# Patient Record
Sex: FEMALE | Race: WHITE | Employment: FULL TIME | URBAN - NONMETROPOLITAN AREA
[De-identification: names, ages, dates, MRNs, and addresses within clinical notes are randomized per-mention and may not be internally consistent; named-entity substitution may affect disease eponyms.]

---

## 2017-06-26 ENCOUNTER — OFFICE VISIT (OUTPATIENT)
Dept: FAMILY MEDICINE CLINIC | Age: 52
End: 2017-06-26

## 2017-06-26 VITALS
RESPIRATION RATE: 20 BRPM | TEMPERATURE: 97.8 F | OXYGEN SATURATION: 95 % | WEIGHT: 126.8 LBS | SYSTOLIC BLOOD PRESSURE: 94 MMHG | DIASTOLIC BLOOD PRESSURE: 58 MMHG | BODY MASS INDEX: 20.38 KG/M2 | HEART RATE: 88 BPM | HEIGHT: 66 IN

## 2017-06-26 DIAGNOSIS — J36 PERITONSILLAR ABSCESS: Primary | ICD-10-CM

## 2017-06-26 PROBLEM — E86.0 DEHYDRATION: Status: ACTIVE | Noted: 2017-06-26

## 2017-06-26 PROCEDURE — 99214 OFFICE O/P EST MOD 30 MIN: CPT | Performed by: FAMILY MEDICINE

## 2017-06-26 ASSESSMENT — ENCOUNTER SYMPTOMS
WHEEZING: 0
EYE PAIN: 0
BLOOD IN STOOL: 0
CONSTIPATION: 0
DIARRHEA: 0
ABDOMINAL PAIN: 0
SORE THROAT: 1
CHEST TIGHTNESS: 0
SHORTNESS OF BREATH: 0
BACK PAIN: 0
NAUSEA: 0
VOMITING: 0
COUGH: 0
RHINORRHEA: 0

## 2017-06-28 ENCOUNTER — TELEPHONE (OUTPATIENT)
Dept: FAMILY MEDICINE CLINIC | Age: 52
End: 2017-06-28

## 2017-07-05 ENCOUNTER — OFFICE VISIT (OUTPATIENT)
Dept: FAMILY MEDICINE CLINIC | Age: 52
End: 2017-07-05

## 2017-07-05 VITALS
SYSTOLIC BLOOD PRESSURE: 100 MMHG | RESPIRATION RATE: 12 BRPM | DIASTOLIC BLOOD PRESSURE: 64 MMHG | BODY MASS INDEX: 20.27 KG/M2 | HEART RATE: 64 BPM | WEIGHT: 125.6 LBS

## 2017-07-05 DIAGNOSIS — J36 PERITONSILLAR ABSCESS: Primary | ICD-10-CM

## 2017-07-05 DIAGNOSIS — F17.210 CIGARETTE NICOTINE DEPENDENCE WITHOUT COMPLICATION: ICD-10-CM

## 2017-07-05 DIAGNOSIS — J02.9 SORE THROAT: ICD-10-CM

## 2017-07-05 PROCEDURE — 99495 TRANSJ CARE MGMT MOD F2F 14D: CPT | Performed by: FAMILY MEDICINE

## 2017-07-05 ASSESSMENT — ENCOUNTER SYMPTOMS
RHINORRHEA: 0
DIARRHEA: 0
EYE PAIN: 0
BACK PAIN: 0
COUGH: 0
SORE THROAT: 1
BLOOD IN STOOL: 0
WHEEZING: 0
VOMITING: 0
ABDOMINAL PAIN: 0
SHORTNESS OF BREATH: 0
CHEST TIGHTNESS: 0
NAUSEA: 0
CONSTIPATION: 0

## 2017-07-05 ASSESSMENT — PATIENT HEALTH QUESTIONNAIRE - PHQ9
2. FEELING DOWN, DEPRESSED OR HOPELESS: 1
1. LITTLE INTEREST OR PLEASURE IN DOING THINGS: 0
SUM OF ALL RESPONSES TO PHQ9 QUESTIONS 1 & 2: 1
SUM OF ALL RESPONSES TO PHQ QUESTIONS 1-9: 1

## 2017-10-30 ENCOUNTER — OFFICE VISIT (OUTPATIENT)
Dept: FAMILY MEDICINE CLINIC | Age: 52
End: 2017-10-30
Payer: COMMERCIAL

## 2017-10-30 VITALS
BODY MASS INDEX: 20.01 KG/M2 | DIASTOLIC BLOOD PRESSURE: 60 MMHG | HEART RATE: 84 BPM | SYSTOLIC BLOOD PRESSURE: 104 MMHG | WEIGHT: 124 LBS | RESPIRATION RATE: 16 BRPM

## 2017-10-30 DIAGNOSIS — F17.210 CIGARETTE NICOTINE DEPENDENCE WITHOUT COMPLICATION: ICD-10-CM

## 2017-10-30 DIAGNOSIS — N91.2 AMENORRHEA: ICD-10-CM

## 2017-10-30 DIAGNOSIS — F32.A DEPRESSION, UNSPECIFIED DEPRESSION TYPE: ICD-10-CM

## 2017-10-30 DIAGNOSIS — F41.9 ANXIETY: Primary | ICD-10-CM

## 2017-10-30 PROCEDURE — 99214 OFFICE O/P EST MOD 30 MIN: CPT | Performed by: FAMILY MEDICINE

## 2017-10-30 ASSESSMENT — ENCOUNTER SYMPTOMS
ABDOMINAL PAIN: 0
NAUSEA: 0
COUGH: 0
VOMITING: 0
BACK PAIN: 0
RHINORRHEA: 0
BLOOD IN STOOL: 0
EYE PAIN: 0
CHEST TIGHTNESS: 0
SORE THROAT: 0
WHEEZING: 0
CONSTIPATION: 0
SHORTNESS OF BREATH: 0
DIARRHEA: 0

## 2017-10-30 NOTE — PROGRESS NOTES
Subjective:      Patient ID: Francis Finch is a 47 y.o. female. HPI  Pt here for a check up. Reviewed BMI of 20. Feeling nervous and on edge. Cries for no reason. Sxs x 1 month. No period since June 2017. , current smoker, pmh reviewed. Lost her dad recently. Review of Systems   Constitutional: Negative for chills, fatigue, fever and unexpected weight change. HENT: Negative for congestion, ear pain, rhinorrhea and sore throat. Eyes: Negative for pain and visual disturbance. Respiratory: Negative for cough, chest tightness, shortness of breath and wheezing. Cardiovascular: Negative for chest pain and palpitations. Gastrointestinal: Negative for abdominal pain, blood in stool, constipation, diarrhea, nausea and vomiting. Genitourinary: Positive for menstrual problem. Negative for difficulty urinating, frequency, hematuria and urgency. Musculoskeletal: Negative for back pain, joint swelling, myalgias and neck pain. Skin: Negative for rash. Neurological: Negative for dizziness and headaches. Hematological: Negative for adenopathy. Does not bruise/bleed easily. Psychiatric/Behavioral: Positive for dysphoric mood. Negative for behavioral problems and sleep disturbance. The patient is nervous/anxious. Objective:   Physical Exam   Constitutional: She is oriented to person, place, and time. She appears well-developed and well-nourished. HENT:   Head: Normocephalic and atraumatic. Right Ear: External ear normal.   Left Ear: External ear normal.   Nose: Nose normal.   Mouth/Throat: Oropharynx is clear and moist.   Eyes: EOM are normal. Pupils are equal, round, and reactive to light. Neck: Neck supple. Carotid bruit is not present. No thyromegaly present. Cardiovascular: Normal rate, regular rhythm and normal heart sounds. Pulmonary/Chest: Breath sounds normal. She has no wheezes. She has no rales. Abdominal: Soft.  Bowel sounds are normal. There is no

## 2017-10-30 NOTE — PATIENT INSTRUCTIONS
Patient Education        Depression Treatment: Care Instructions  Your Care Instructions  Depression is a condition that affects the way you feel, think, and act. It causes symptoms such as low energy, loss of interest in daily activities, and sadness or grouchiness that goes on for a long time. Depression is very common and affects men and women of all ages. Depression is a medical illness caused by changes in the natural chemicals in your brain. It is not a character flaw, and it does not mean that you are a bad or weak person. It does not mean that you are going crazy. It is important to know that depression can be treated. Medicines, counseling, and self-care can all help. Many people do not get help because they are embarrassed or think that they will get over the depression on their own. But some people do not get better without treatment. Follow-up care is a key part of your treatment and safety. Be sure to make and go to all appointments, and call your doctor if you are having problems. It's also a good idea to know your test results and keep a list of the medicines you take. How can you care for yourself at home? Learn about antidepressant medicines  Antidepressant medicines can improve or end the symptoms of depression. You may need to take the medicine for at least 6 months, and often longer. Keep taking your medicine even if you feel better. If you stop taking it too soon, your symptoms may come back or get worse. You may start to feel better within 1 to 3 weeks of taking antidepressant medicine. But it can take as many as 6 to 8 weeks to see more improvement. Talk to your doctor if you have problems with your medicine or if you do not notice any improvement after 3 weeks. Antidepressants can make you feel tired, dizzy, or nervous. Some people have dry mouth, constipation, headaches, sexual problems, an upset stomach, or diarrhea.  Many of these side effects are mild and go away on their own after you take the medicine for a few weeks. Some may last longer. Talk to your doctor if side effects bother you too much. You might be able to try a different medicine. If you are pregnant or breastfeeding, talk to your doctor about what medicines you can take. Learn about counseling  In many cases, counseling can work as well as medicines to treat mild to moderate depression. Counseling is done by licensed mental health providers, such as psychologists, social workers, and some types of nurses. It can be done in one-on-one sessions or in a group setting. Many people find group sessions helpful. Cognitive-behavioral therapy is a type of counseling. In this treatment therapy, you learn how to see and change unhelpful thinking styles that may be adding to your depression. Counseling and medicines often work well when used together. To manage depression  · Be physically active. Getting 30 minutes of exercise each day is good for your body and your mind. Begin slowly if it is hard for you to get started. If you already exercise, keep it up. · Plan something pleasant for yourself every day. Include activities that you have enjoyed in the past.  · Get enough sleep. Talk to your doctor if you have problems sleeping. · Eat a balanced diet. If you do not feel hungry, eat small snacks rather than large meals. · Do not drink alcohol, use illegal drugs, or take medicines that your doctor has not prescribed for you. They may interfere with your treatment. · Spend time with family and friends. It may help to speak openly about your depression with people you trust.  · Take your medicines exactly as prescribed. Call your doctor if you think you are having a problem with your medicine. · Do not make major life decisions while you are depressed. Depression may change the way you think. You will be able to make better decisions after you feel better. · Think positively.  Challenge negative thoughts with statements such as \"I am hopeful\"; \"Things will get better\"; and \"I can ask for the help I need. \" Write down these statements and read them often, even if you don't believe them yet. · Be patient with yourself. It took time for your depression to develop, and it will take time for your symptoms to improve. Do not take on too much or be too hard on yourself. · Learn all you can about depression from written and online materials. · Check out behavioral health classes to learn more about dealing with depression. · Keep the numbers for these national suicide hotlines: 0-237-678-TALK (9-387.527.8537) and 3-390-MUYUMMB (5-826.948.2525). If you or someone you know talks about suicide or feeling hopeless, get help right away. When should you call for help? Call 911 anytime you think you may need emergency care. For example, call if:  · You feel you cannot stop from hurting yourself or someone else. Call your doctor now or seek immediate medical care if:  · You hear voices. · You feel much more depressed. Watch closely for changes in your health, and be sure to contact your doctor if:  · You are having problems with your depression medicine. · You are not getting better as expected. Where can you learn more? Go to https://chilaeb.[employer]. org and sign in to your [employer] account. Enter P339 in the BallLogic box to learn more about \"Depression Treatment: Care Instructions. \"     If you do not have an account, please click on the \"Sign Up Now\" link. Current as of: July 26, 2016  Content Version: 11.3  © 0525-3991 [employer]Today.[employer]. Care instructions adapted under license by [facility] ([facility]). If you have questions about a medical condition or this instruction, always ask your healthcare professional. [address] any warranty or liability for your use of this information.        Patient Education        Anxiety Disorder: Care Instructions  Your Care Instructions  Anxiety is a normal are.  · Get at least 30 minutes of exercise on most days of the week to relieve stress. Walking is a good choice. You also may want to do other activities, such as running, swimming, cycling, or playing tennis or team sports. Relaxation techniques  Do relaxation exercises 10 to 20 minutes a day. You can play soothing, relaxing music while you do them, if you wish. · Tell others in your house that you are going to do your relaxation exercises. Ask them not to disturb you. · Find a comfortable place, away from all distractions and noise. · Lie down on your back, or sit with your back straight. · Focus on your breathing. Make it slow and steady. · Breathe in through your nose. Breathe out through either your nose or mouth. · Breathe deeply, filling up the area between your navel and your rib cage. Breathe so that your belly goes up and down. · Do not hold your breath. · Breathe like this for 5 to 10 minutes. Notice the feeling of calmness throughout your whole body. As you continue to breathe slowly and deeply, relax by doing the following for another 5 to 10 minutes:  · Tighten and relax each muscle group in your body. You can begin at your toes and work your way up to your head. · Imagine your muscle groups relaxing and becoming heavy. · Empty your mind of all thoughts. · Let yourself relax more and more deeply. · Become aware of the state of calmness that surrounds you. · When your relaxation time is over, you can bring yourself back to alertness by moving your fingers and toes and then your hands and feet and then stretching and moving your entire body. Sometimes people fall asleep during relaxation, but they usually wake up shortly afterward. · Always give yourself time to return to full alertness before you drive a car or do anything that might cause an accident if you are not fully alert. Never play a relaxation tape while you drive a car. When should you call for help?   Call 911 anytime you program to help you quit smoking. When you choose a program, look for one that has proven success. Ask your doctor for ideas. You will greatly increase your chances of success if you take medicine as well as get counseling or join a cessation program.  Some of the changes you feel when you first quit tobacco are uncomfortable. Your body will miss the nicotine at first, and you may feel short-tempered and grumpy. You may have trouble sleeping or concentrating. Medicine can help you deal with these symptoms. You may struggle with changing your smoking habits and rituals. The last step is the tricky one: Be prepared for the smoking urge to continue for a time. This is a lot to deal with, but keep at it. You will feel better. Follow-up care is a key part of your treatment and safety. Be sure to make and go to all appointments, and call your doctor if you are having problems. Its also a good idea to know your test results and keep a list of the medicines you take. How can you care for yourself at home? · Ask your family, friends, and coworkers for support. You have a better chance of quitting if you have help and support. · Join a support group, such as Nicotine Anonymous, for people who are trying to quit smoking. · Consider signing up for a smoking cessation program, such as the American Lung Association's Freedom from Smoking program.  · Set a quit date. Pick your date carefully so that it is not right in the middle of a big deadline or stressful time. Once you quit, do not even take a puff. Get rid of all ashtrays and lighters after your last cigarette. Clean your house and your clothes so that they do not smell of smoke. · Learn how to be a nonsmoker. Think about ways you can avoid those things that make you reach for a cigarette. ¨ Avoid situations that put you at greatest risk for smoking. For some people, it is hard to have a drink with friends without smoking.  For others, they might skip a coffee break with coworkers who smoke. ¨ Change your daily routine. Take a different route to work or eat a meal in a different place. · Cut down on stress. Calm yourself or release tension by doing an activity you enjoy, such as reading a book, taking a hot bath, or gardening. · Talk to your doctor or pharmacist about nicotine replacement therapy, which replaces the nicotine in your body. You still get nicotine but you do not use tobacco. Nicotine replacement products help you slowly reduce the amount of nicotine you need. These products come in several forms, many of them available over-the-counter:  ¨ Nicotine patches  ¨ Nicotine gum and lozenges  ¨ Nicotine inhaler  · Ask your doctor about bupropion (Wellbutrin) or varenicline (Chantix), which are prescription medicines. They do not contain nicotine. They help you by reducing withdrawal symptoms, such as stress and anxiety. · Some people find hypnosis, acupuncture, and massage helpful for ending the smoking habit. · Eat a healthy diet and get regular exercise. Having healthy habits will help your body move past its craving for nicotine. · Be prepared to keep trying. Most people are not successful the first few times they try to quit. Do not get mad at yourself if you smoke again. Make a list of things you learned and think about when you want to try again, such as next week, next month, or next year. Where can you learn more? Go to https://Lookingglass Cyber [email or web address].Dynadmic. org and sign in to your [employer] account. Enter G932 in the LDL Technology box to learn more about \"Stopping Smoking: Care Instructions. \"     If you do not have an account, please click on the \"Sign Up Now\" link. Current as of: March 20, 2017  Content Version: 11.3  © 3472-7789 [employer]gly. Care instructions adapted under license by [facility] ([facility]).  If you have questions about a medical condition or this instruction, always ask your healthcare professional. Dina Diaz

## 2017-10-31 LAB
ABSOLUTE BASO #: 0.1 K/UL (ref 0–0.1)
ABSOLUTE EOS #: 0.1 K/UL (ref 0.1–0.4)
ABSOLUTE LYMPH #: 2.4 K/UL (ref 0.8–5.2)
ABSOLUTE MONO #: 0.6 K/UL (ref 0.1–0.9)
ABSOLUTE NEUT #: 4.6 K/UL (ref 1.3–9.1)
ALBUMIN SERPL-MCNC: 4.2 G/DL (ref 3.2–5.3)
ALK PHOSPHATASE: 40 IU/L (ref 35–121)
ALT SERPL-CCNC: 13 IU/L (ref 5–59)
ANION GAP SERPL CALCULATED.3IONS-SCNC: 11 MMOL/L
AST SERPL-CCNC: 16 IU/L (ref 10–42)
BASOPHILS RELATIVE PERCENT: 1 %
BILIRUB SERPL-MCNC: 0.5 MG/DL (ref 0.2–1.3)
BUN BLDV-MCNC: 15 MG/DL (ref 10–20)
CALCIUM SERPL-MCNC: 9.4 MG/DL (ref 8.7–10.8)
CHLORIDE BLD-SCNC: 108 MMOL/L (ref 95–111)
CO2: 27 MMOL/L (ref 21–32)
CREAT SERPL-MCNC: 0.8 MG/DL (ref 0.5–1.3)
EGFR AFRICAN AMERICAN: 91
EGFR IF NONAFRICAN AMERICAN: 75
EOSINOPHILS RELATIVE PERCENT: 1 %
FOLLICLE STIMULATING HORMONE: 67.1 MIU/ML
GLUCOSE: 79 MG/DL (ref 70–100)
HCT VFR BLD CALC: 43.1 % (ref 36–48)
HEMOGLOBIN: 14.5 G/DL (ref 12–16)
LYMPHOCYTE %: 31.2 %
MCH RBC QN AUTO: 29.4 PG (ref 27–34)
MCHC RBC AUTO-ENTMCNC: 33.6 G/DL (ref 31–36)
MCV RBC AUTO: 87.2 FL (ref 80–100)
MONOCYTES # BLD: 7.1 %
NEUTROPHILS RELATIVE PERCENT: 59.4 %
PDW BLD-RTO: 12.9 % (ref 10.8–14.8)
PLATELETS: 231 K/UL (ref 150–450)
POTASSIUM SERPL-SCNC: 4.4 MMOL/L (ref 3.5–5.4)
RBC: 4.94 M/UL (ref 4–5.5)
SODIUM BLD-SCNC: 142 MMOL/L (ref 134–147)
TOTAL PROTEIN: 6.5 G/DL (ref 5.8–8)
WBC: 7.7 K/UL (ref 3.7–10.8)

## 2017-11-01 LAB — ESTROGEN TOTAL: 181 PG/ML

## 2017-11-02 ENCOUNTER — TELEPHONE (OUTPATIENT)
Dept: FAMILY MEDICINE CLINIC | Age: 52
End: 2017-11-02

## 2017-11-02 NOTE — TELEPHONE ENCOUNTER
----- Message from Amaury Canchola MD sent at 11/2/2017  8:41 AM EDT -----  Estrogen level is good. FSH is high c/w menopausal.  CBC and cmp are normal.  Labs are good except looks like menopause.   Does she want to start anxiety/depression med??

## 2017-11-22 NOTE — TELEPHONE ENCOUNTER
I have attempted without success to contact this patient by phone to notify them of lab results. Letter mailed to patient requesting patient to call the office for results.

## 2018-01-31 ENCOUNTER — OFFICE VISIT (OUTPATIENT)
Dept: FAMILY MEDICINE CLINIC | Age: 53
End: 2018-01-31
Payer: COMMERCIAL

## 2018-01-31 VITALS
HEART RATE: 94 BPM | HEIGHT: 67 IN | DIASTOLIC BLOOD PRESSURE: 60 MMHG | SYSTOLIC BLOOD PRESSURE: 98 MMHG | WEIGHT: 122.2 LBS | RESPIRATION RATE: 12 BRPM | BODY MASS INDEX: 19.18 KG/M2

## 2018-01-31 DIAGNOSIS — F17.210 CIGARETTE NICOTINE DEPENDENCE WITHOUT COMPLICATION: ICD-10-CM

## 2018-01-31 DIAGNOSIS — R63.0 DECREASED APPETITE: ICD-10-CM

## 2018-01-31 DIAGNOSIS — F41.9 ANXIETY: Primary | ICD-10-CM

## 2018-01-31 PROCEDURE — 99214 OFFICE O/P EST MOD 30 MIN: CPT | Performed by: FAMILY MEDICINE

## 2018-01-31 RX ORDER — PAROXETINE HYDROCHLORIDE 20 MG/1
20 TABLET, FILM COATED ORAL EVERY MORNING
Qty: 30 TABLET | Refills: 0 | Status: SHIPPED | OUTPATIENT
Start: 2018-01-31 | End: 2018-02-27 | Stop reason: SDUPTHER

## 2018-01-31 RX ORDER — HYDROXYZINE HYDROCHLORIDE 25 MG/1
25 TABLET, FILM COATED ORAL 3 TIMES DAILY PRN
Qty: 30 TABLET | Refills: 0 | Status: SHIPPED | OUTPATIENT
Start: 2018-01-31 | End: 2018-09-28 | Stop reason: SDUPTHER

## 2018-01-31 ASSESSMENT — ENCOUNTER SYMPTOMS
DIARRHEA: 0
VOMITING: 0
CONSTIPATION: 0
EYE PAIN: 0
CHEST TIGHTNESS: 0
SHORTNESS OF BREATH: 0
RHINORRHEA: 0
WHEEZING: 0
COUGH: 0
BACK PAIN: 0
NAUSEA: 0
SORE THROAT: 0
ABDOMINAL PAIN: 0
BLOOD IN STOOL: 0

## 2018-01-31 NOTE — PATIENT INSTRUCTIONS
Patient Education        Stopping Smoking: Care Instructions  Your Care Instructions    Cigarette smokers crave the nicotine in cigarettes. Giving it up is much harder than simply changing a habit. Your body has to stop craving the nicotine. It is hard to quit, but you can do it. There are many tools that people use to quit smoking. You may find that combining tools works best for you. There are several steps to quitting. First you get ready to quit. Then you get support to help you. After that, you learn new skills and behaviors to become a nonsmoker. For many people, a necessary step is getting and using medicine. Your doctor will help you set up the plan that best meets your needs. You may want to attend a smoking cessation program to help you quit smoking. When you choose a program, look for one that has proven success. Ask your doctor for ideas. You will greatly increase your chances of success if you take medicine as well as get counseling or join a cessation program.  Some of the changes you feel when you first quit tobacco are uncomfortable. Your body will miss the nicotine at first, and you may feel short-tempered and grumpy. You may have trouble sleeping or concentrating. Medicine can help you deal with these symptoms. You may struggle with changing your smoking habits and rituals. The last step is the tricky one: Be prepared for the smoking urge to continue for a time. This is a lot to deal with, but keep at it. You will feel better. Follow-up care is a key part of your treatment and safety. Be sure to make and go to all appointments, and call your doctor if you are having problems. It's also a good idea to know your test results and keep a list of the medicines you take. How can you care for yourself at home? · Ask your family, friends, and coworkers for support. You have a better chance of quitting if you have help and support.   · Join a support group, such as Nicotine Anonymous, for people who get mad at yourself if you smoke again. Make a list of things you learned and think about when you want to try again, such as next week, next month, or next year. Where can you learn more? Go to https://ester.Playviews. org and sign in to your [employer] account. Enter C115 in the [facility] box to learn more about \"Stopping Smoking: Care Instructions. \"     If you do not have an account, please click on the \"Sign Up Now\" link. Current as of: March 20, 2017  Content Version: 11.5  © 8962-5567 [employer]. Care instructions adapted under license by [facility] ([facility]). If you have questions about a medical condition or this instruction, always ask your healthcare professional. [place] [address] any warranty or liability for your use of this information. Patient Education        Anxiety Disorder: Care Instructions  Your Care Instructions    Anxiety is a normal reaction to stress. Difficult situations can cause you to have symptoms such as sweaty palms and a nervous feeling. In an anxiety disorder, the symptoms are far more severe. Constant worry, muscle tension, trouble sleeping, nausea and diarrhea, and other symptoms can make normal daily activities difficult or impossible. These symptoms may occur for no reason, and they can affect your work, school, or social life. Medicines, counseling, and self-care can all help. Follow-up care is a key part of your treatment and safety. Be sure to make and go to all appointments, and call your doctor if you are having problems. It's also a good idea to know your test results and keep a list of the medicines you take. How can you care for yourself at home? · Take medicines exactly as directed. Call your doctor if you think you are having a problem with your medicine. · Go to your counseling sessions and follow-up appointments. · Recognize and accept your anxiety.  Then, when you are in a situation that makes you anxious, relax by doing the following for another 5 to 10 minutes:  · Tighten and relax each muscle group in your body. You can begin at your toes and work your way up to your head. · Imagine your muscle groups relaxing and becoming heavy. · Empty your mind of all thoughts. · Let yourself relax more and more deeply. · Become aware of the state of calmness that surrounds you. · When your relaxation time is over, you can bring yourself back to alertness by moving your fingers and toes and then your hands and feet and then stretching and moving your entire body. Sometimes people fall asleep during relaxation, but they usually wake up shortly afterward. · Always give yourself time to return to full alertness before you drive a car or do anything that might cause an accident if you are not fully alert. Never play a relaxation tape while you drive a car. When should you call for help? Call 911 anytime you think you may need emergency care. For example, call if:  ? · You feel you cannot stop from hurting yourself or someone else. ? Keep the numbers for these national suicide hotlines: 3-365-717-TALK (5-314-481-423-601-8186) and 3-980-DEKFURV (9-696.924.2365). If you or someone you know talks about suicide or feeling hopeless, get help right away. ? Watch closely for changes in your health, and be sure to contact your doctor if:  ? · You have anxiety or fear that affects your life. ? · You have symptoms of anxiety that are new or different from those you had before. Where can you learn more? Go to https://BioMimetic Therapeuticstobias.[employer]. org and sign in to your [employer] account. Enter P754 in the Threadflip box to learn more about \"Anxiety Disorder: Care Instructions. \"     If you do not have an account, please click on the \"Sign Up Now\" link. Current as of: May 12, 2017  Content Version: 11.5  © 0251-9478 Healthwise, Inc[employer]. Care instructions adapted under license by [facility] ([facility]).  If you have questions about a

## 2018-02-27 DIAGNOSIS — F41.9 ANXIETY: ICD-10-CM

## 2018-02-27 RX ORDER — PAROXETINE HYDROCHLORIDE 20 MG/1
20 TABLET, FILM COATED ORAL EVERY MORNING
Qty: 30 TABLET | Refills: 7 | Status: SHIPPED | OUTPATIENT
Start: 2018-02-27 | End: 2018-09-28 | Stop reason: SDUPTHER

## 2018-09-26 PROBLEM — E86.0 DEHYDRATION: Status: RESOLVED | Noted: 2017-06-26 | Resolved: 2018-09-26

## 2018-09-28 ENCOUNTER — OFFICE VISIT (OUTPATIENT)
Dept: FAMILY MEDICINE CLINIC | Age: 53
End: 2018-09-28
Payer: COMMERCIAL

## 2018-09-28 VITALS
BODY MASS INDEX: 20.19 KG/M2 | RESPIRATION RATE: 12 BRPM | SYSTOLIC BLOOD PRESSURE: 92 MMHG | HEART RATE: 66 BPM | WEIGHT: 127 LBS | DIASTOLIC BLOOD PRESSURE: 60 MMHG

## 2018-09-28 DIAGNOSIS — F41.9 ANXIETY: Primary | ICD-10-CM

## 2018-09-28 PROCEDURE — 99213 OFFICE O/P EST LOW 20 MIN: CPT | Performed by: FAMILY MEDICINE

## 2018-09-28 RX ORDER — HYDROXYZINE HYDROCHLORIDE 25 MG/1
25 TABLET, FILM COATED ORAL 3 TIMES DAILY PRN
Qty: 30 TABLET | Refills: 0 | Status: SHIPPED | OUTPATIENT
Start: 2018-09-28 | End: 2018-12-18

## 2018-09-28 RX ORDER — PAROXETINE HYDROCHLORIDE 20 MG/1
20 TABLET, FILM COATED ORAL EVERY MORNING
Qty: 30 TABLET | Refills: 11 | Status: SHIPPED | OUTPATIENT
Start: 2018-09-28 | End: 2019-07-18 | Stop reason: SDUPTHER

## 2018-09-28 ASSESSMENT — ENCOUNTER SYMPTOMS
SHORTNESS OF BREATH: 0
CHEST TIGHTNESS: 0
EYE PAIN: 0
VOMITING: 0
RHINORRHEA: 0
COUGH: 0
SORE THROAT: 0
WHEEZING: 0
NAUSEA: 0
ABDOMINAL PAIN: 0
BACK PAIN: 0
BLOOD IN STOOL: 0
CONSTIPATION: 0
DIARRHEA: 0

## 2018-12-18 ENCOUNTER — OFFICE VISIT (OUTPATIENT)
Dept: FAMILY MEDICINE CLINIC | Age: 53
End: 2018-12-18
Payer: COMMERCIAL

## 2018-12-18 VITALS
HEART RATE: 72 BPM | BODY MASS INDEX: 21.28 KG/M2 | RESPIRATION RATE: 12 BRPM | SYSTOLIC BLOOD PRESSURE: 112 MMHG | WEIGHT: 132.4 LBS | TEMPERATURE: 98.1 F | HEIGHT: 66 IN | DIASTOLIC BLOOD PRESSURE: 68 MMHG

## 2018-12-18 DIAGNOSIS — F17.210 CIGARETTE NICOTINE DEPENDENCE WITHOUT COMPLICATION: ICD-10-CM

## 2018-12-18 DIAGNOSIS — R20.2 ARM PARESTHESIA, LEFT: ICD-10-CM

## 2018-12-18 DIAGNOSIS — M50.30 DDD (DEGENERATIVE DISC DISEASE), CERVICAL: Primary | ICD-10-CM

## 2018-12-18 PROCEDURE — 99214 OFFICE O/P EST MOD 30 MIN: CPT | Performed by: FAMILY MEDICINE

## 2018-12-18 RX ORDER — PREDNISONE 20 MG/1
TABLET ORAL
Qty: 15 TABLET | Refills: 0 | Status: SHIPPED | OUTPATIENT
Start: 2018-12-18 | End: 2019-01-17

## 2018-12-18 RX ORDER — CYCLOBENZAPRINE HCL 10 MG
10 TABLET ORAL 3 TIMES DAILY PRN
Qty: 30 TABLET | Refills: 0 | Status: SHIPPED | OUTPATIENT
Start: 2018-12-18 | End: 2019-01-04 | Stop reason: SDUPTHER

## 2018-12-18 RX ORDER — HYDROCODONE BITARTRATE AND ACETAMINOPHEN 5; 325 MG/1; MG/1
1 TABLET ORAL EVERY 6 HOURS PRN
Qty: 28 TABLET | Refills: 0 | Status: SHIPPED | OUTPATIENT
Start: 2018-12-18 | End: 2018-12-28 | Stop reason: SDUPTHER

## 2018-12-18 ASSESSMENT — ENCOUNTER SYMPTOMS
RHINORRHEA: 0
BACK PAIN: 1
COUGH: 0
NAUSEA: 0
VOMITING: 0
DIARRHEA: 0
EYE PAIN: 0
BLOOD IN STOOL: 0
WHEEZING: 0
SHORTNESS OF BREATH: 0
SORE THROAT: 0
ABDOMINAL PAIN: 0
CHEST TIGHTNESS: 0
CONSTIPATION: 0

## 2018-12-18 ASSESSMENT — PATIENT HEALTH QUESTIONNAIRE - PHQ9
1. LITTLE INTEREST OR PLEASURE IN DOING THINGS: 0
SUM OF ALL RESPONSES TO PHQ QUESTIONS 1-9: 0
SUM OF ALL RESPONSES TO PHQ9 QUESTIONS 1 & 2: 0
SUM OF ALL RESPONSES TO PHQ QUESTIONS 1-9: 0
2. FEELING DOWN, DEPRESSED OR HOPELESS: 0

## 2018-12-18 NOTE — PATIENT INSTRUCTIONS
Patient Education        Cervical Disc Disease: Care Instructions  Your Care Instructions    Cervical disc disease results from damage, disease, or wear and tear to the discs between the bones (vertebra) in your neck. The discs act as shock absorbers for the spine and keep the spine flexible. When a disc is damaged, it can bulge out and press against the nerve roots or spinal cord. This is sometimes called a herniated or \"slipped disc. \" This pressure can cause pain and numbness or tingling in your arms and hands. It can also cause weakness in your legs. An accident can damage a disc and cause it to break open (rupture). Aging and hard physical work can also cause damage to cervical discs. The first treatments for cervical disc disease include physical therapy, special neck exercises, heat, and pain medicine. If these fail, your doctor may inject steroids and pain medicine into your neck. Surgery is usually done only if other treatments have not worked. Follow-up care is a key part of your treatment and safety. Be sure to make and go to all appointments, and call your doctor if you are having problems. It's also a good idea to know your test results and keep a list of the medicines you take. How can you care for yourself at home? · Take pain medicines exactly as directed. ? If the doctor gave you a prescription medicine for pain, take it as prescribed. ? If you are not taking a prescription pain medicine, ask your doctor if you can take an over-the-counter medicine. · Don't spend too long in one position. Take short breaks to move around and change positions. · Wear a seat belt and shoulder harness when you are in a car. · Sleep with a pillow under your head and neck that keeps your neck straight. · Follow your doctor's instructions for gentle neck-stretching exercises. · Do not smoke. Smoking can slow healing of your discs.  If you need help quitting, talk to your doctor about stop-smoking programs and

## 2018-12-18 NOTE — PROGRESS NOTES
Breath sounds normal. She has no wheezes. She has no rales. Abdominal: Soft. Bowel sounds are normal. There is no tenderness. There is no rebound and no guarding. Musculoskeletal: She exhibits no edema. Cervical back: She exhibits pain. Back:    Lymphadenopathy:     She has no cervical adenopathy. Neurological: She is alert and oriented to person, place, and time. She has normal reflexes. No cranial nerve deficit. Skin: Skin is warm and dry. No rash noted. Psychiatric: She has a normal mood and affect. Nursing note and vitals reviewed. Assessment:      Cervical DDD  Left arm paresthesias  Nicotine dependence      Plan:      Prednisone 20 mg BID/QD  Flexeril for spasms  Norco 5/325 as needed  Resee Dosier  MRI cervical spine with and without    Controlled Substances Monitoring:     RX Monitoring 12/18/2018   Attestation The Prescription Monitoring Report for this patient was reviewed today. Documentation Possible medication side effects, risk of tolerance/dependence & alternative treatments discussed. ;No signs of potential drug abuse or diversion identified.                Elton Mclaughlin MD

## 2018-12-28 DIAGNOSIS — R20.2 ARM PARESTHESIA, LEFT: ICD-10-CM

## 2018-12-28 DIAGNOSIS — M50.30 DDD (DEGENERATIVE DISC DISEASE), CERVICAL: ICD-10-CM

## 2018-12-28 RX ORDER — HYDROCODONE BITARTRATE AND ACETAMINOPHEN 5; 325 MG/1; MG/1
1 TABLET ORAL EVERY 6 HOURS PRN
Qty: 28 TABLET | Refills: 0 | Status: SHIPPED | OUTPATIENT
Start: 2018-12-28 | End: 2019-01-04 | Stop reason: SDUPTHER

## 2019-01-04 DIAGNOSIS — R20.2 ARM PARESTHESIA, LEFT: ICD-10-CM

## 2019-01-04 DIAGNOSIS — M50.30 DDD (DEGENERATIVE DISC DISEASE), CERVICAL: ICD-10-CM

## 2019-01-04 RX ORDER — HYDROCODONE BITARTRATE AND ACETAMINOPHEN 5; 325 MG/1; MG/1
1 TABLET ORAL EVERY 6 HOURS PRN
Qty: 28 TABLET | Refills: 0 | Status: SHIPPED | OUTPATIENT
Start: 2019-01-04 | End: 2019-02-22 | Stop reason: SDUPTHER

## 2019-01-04 RX ORDER — CYCLOBENZAPRINE HCL 10 MG
10 TABLET ORAL 3 TIMES DAILY PRN
Qty: 30 TABLET | Refills: 0 | Status: SHIPPED | OUTPATIENT
Start: 2019-01-04 | End: 2019-01-14

## 2019-01-10 ENCOUNTER — HOSPITAL ENCOUNTER (OUTPATIENT)
Dept: MRI IMAGING | Age: 54
Discharge: HOME | End: 2019-01-10
Payer: COMMERCIAL

## 2019-01-10 DIAGNOSIS — R20.2 ARM PARESTHESIA, LEFT: ICD-10-CM

## 2019-01-10 DIAGNOSIS — M50.30 DDD (DEGENERATIVE DISC DISEASE), CERVICAL: ICD-10-CM

## 2019-01-10 PROCEDURE — 72156 MRI NECK SPINE W/O & W/DYE: CPT

## 2019-01-10 PROCEDURE — 6360000004 HC RX CONTRAST MEDICATION: Performed by: FAMILY MEDICINE

## 2019-01-10 PROCEDURE — A9579 GAD-BASE MR CONTRAST NOS,1ML: HCPCS | Performed by: FAMILY MEDICINE

## 2019-01-10 RX ADMIN — GADOTERIDOL 10 ML: 279.3 INJECTION, SOLUTION INTRAVENOUS at 08:42

## 2019-01-11 ENCOUNTER — TELEPHONE (OUTPATIENT)
Dept: FAMILY MEDICINE CLINIC | Age: 54
End: 2019-01-11

## 2019-01-16 ENCOUNTER — TELEPHONE (OUTPATIENT)
Dept: FAMILY MEDICINE CLINIC | Age: 54
End: 2019-01-16

## 2019-01-17 ENCOUNTER — OFFICE VISIT (OUTPATIENT)
Dept: FAMILY MEDICINE CLINIC | Age: 54
End: 2019-01-17
Payer: COMMERCIAL

## 2019-01-17 VITALS
HEART RATE: 76 BPM | RESPIRATION RATE: 12 BRPM | SYSTOLIC BLOOD PRESSURE: 98 MMHG | BODY MASS INDEX: 21.82 KG/M2 | WEIGHT: 137.2 LBS | DIASTOLIC BLOOD PRESSURE: 66 MMHG

## 2019-01-17 DIAGNOSIS — F32.A DEPRESSION, UNSPECIFIED DEPRESSION TYPE: ICD-10-CM

## 2019-01-17 DIAGNOSIS — M50.30 DDD (DEGENERATIVE DISC DISEASE), CERVICAL: Primary | ICD-10-CM

## 2019-01-17 DIAGNOSIS — G93.89 BRAIN MASS: ICD-10-CM

## 2019-01-17 PROCEDURE — 99214 OFFICE O/P EST MOD 30 MIN: CPT | Performed by: FAMILY MEDICINE

## 2019-01-17 RX ORDER — CYCLOBENZAPRINE HCL 10 MG
10 TABLET ORAL
COMMUNITY
Start: 2016-03-18 | End: 2019-07-18 | Stop reason: ALTCHOICE

## 2019-01-17 ASSESSMENT — ENCOUNTER SYMPTOMS
SORE THROAT: 0
VOMITING: 0
WHEEZING: 0
SHORTNESS OF BREATH: 0
CHEST TIGHTNESS: 0
RHINORRHEA: 0
EYE PAIN: 0
COUGH: 0
ABDOMINAL PAIN: 0
BACK PAIN: 0
NAUSEA: 0
DIARRHEA: 0
BLOOD IN STOOL: 0
CONSTIPATION: 0

## 2019-01-28 ENCOUNTER — TELEPHONE (OUTPATIENT)
Dept: FAMILY MEDICINE CLINIC | Age: 54
End: 2019-01-28

## 2019-01-28 ENCOUNTER — HOSPITAL ENCOUNTER (OUTPATIENT)
Dept: MRI IMAGING | Age: 54
Discharge: HOME | End: 2019-01-28
Payer: COMMERCIAL

## 2019-01-28 DIAGNOSIS — G93.89 BRAIN MASS: ICD-10-CM

## 2019-01-28 PROCEDURE — A9579 GAD-BASE MR CONTRAST NOS,1ML: HCPCS | Performed by: FAMILY MEDICINE

## 2019-01-28 PROCEDURE — 70553 MRI BRAIN STEM W/O & W/DYE: CPT

## 2019-01-28 PROCEDURE — 6360000004 HC RX CONTRAST MEDICATION: Performed by: FAMILY MEDICINE

## 2019-01-28 RX ADMIN — GADOTERIDOL 10 ML: 279.3 INJECTION, SOLUTION INTRAVENOUS at 09:07

## 2019-02-18 ENCOUNTER — NURSE ONLY (OUTPATIENT)
Dept: FAMILY MEDICINE CLINIC | Age: 54
End: 2019-02-18
Payer: COMMERCIAL

## 2019-02-18 DIAGNOSIS — G93.89 BRAIN MASS: ICD-10-CM

## 2019-02-18 LAB
CREAT SERPL-MCNC: 0.9 MG/DL (ref 0.4–1.2)
GFR SERPL CREATININE-BSD FRML MDRD: 65 ML/MIN/1.73M2

## 2019-02-18 PROCEDURE — 36415 COLL VENOUS BLD VENIPUNCTURE: CPT | Performed by: FAMILY MEDICINE

## 2019-02-21 ENCOUNTER — TELEPHONE (OUTPATIENT)
Dept: FAMILY MEDICINE CLINIC | Age: 54
End: 2019-02-21

## 2019-02-21 DIAGNOSIS — R20.2 ARM PARESTHESIA, LEFT: ICD-10-CM

## 2019-02-21 DIAGNOSIS — M50.30 DDD (DEGENERATIVE DISC DISEASE), CERVICAL: ICD-10-CM

## 2019-02-22 RX ORDER — HYDROCODONE BITARTRATE AND ACETAMINOPHEN 5; 325 MG/1; MG/1
1 TABLET ORAL EVERY 6 HOURS PRN
Qty: 28 TABLET | Refills: 0 | Status: SHIPPED | OUTPATIENT
Start: 2019-02-22 | End: 2019-05-10 | Stop reason: SDUPTHER

## 2019-04-01 DIAGNOSIS — F41.9 ANXIETY: ICD-10-CM

## 2019-04-01 RX ORDER — PAROXETINE HYDROCHLORIDE 20 MG/1
20 TABLET, FILM COATED ORAL EVERY MORNING
Qty: 30 TABLET | Refills: 7 | OUTPATIENT
Start: 2019-04-01

## 2019-04-04 ENCOUNTER — TELEPHONE (OUTPATIENT)
Dept: FAMILY MEDICINE CLINIC | Age: 54
End: 2019-04-04

## 2019-05-10 DIAGNOSIS — R20.2 ARM PARESTHESIA, LEFT: ICD-10-CM

## 2019-05-10 DIAGNOSIS — M50.30 DDD (DEGENERATIVE DISC DISEASE), CERVICAL: ICD-10-CM

## 2019-05-10 RX ORDER — HYDROCODONE BITARTRATE AND ACETAMINOPHEN 5; 325 MG/1; MG/1
1 TABLET ORAL EVERY 6 HOURS PRN
Qty: 28 TABLET | Refills: 0 | Status: CANCELLED | OUTPATIENT
Start: 2019-05-10 | End: 2019-05-17

## 2019-05-10 RX ORDER — HYDROCODONE BITARTRATE AND ACETAMINOPHEN 5; 325 MG/1; MG/1
1 TABLET ORAL EVERY 6 HOURS PRN
Qty: 28 TABLET | Refills: 0 | Status: SHIPPED | OUTPATIENT
Start: 2019-05-10 | End: 2019-07-18 | Stop reason: SDUPTHER

## 2019-05-10 NOTE — TELEPHONE ENCOUNTER
Dany Glasgowcy Cornell needs refill of   Requested Prescriptions     Pending Prescriptions Disp Refills    HYDROcodone-acetaminophen (NORCO) 5-325 MG per tablet 28 tablet 0     Sig: Take 1 tablet by mouth every 6 hours as needed for Pain for up to 7 days. Last Filled on:  2/22/19    Last Visit Date: 1/17/2019 DDD    Next Visit Date:    Visit date not found    Has #1 left. 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 Pls call Dipak at [phone] only if probs.   Zip: [id number]

## 2019-05-10 NOTE — TELEPHONE ENCOUNTER
ep'ed norco to pharm requested      Controlled Substances Monitoring:     RX Monitoring 5/10/2019   Attestation The Prescription Monitoring Report for this patient was reviewed today.    Chronic Pain Routine Monitoring No signs of potential drug abuse or diversion identified: otherwise, see note documentation

## 2019-07-18 ENCOUNTER — OFFICE VISIT (OUTPATIENT)
Dept: FAMILY MEDICINE CLINIC | Age: 54
End: 2019-07-18
Payer: COMMERCIAL

## 2019-07-18 VITALS
WEIGHT: 131.4 LBS | TEMPERATURE: 98.1 F | DIASTOLIC BLOOD PRESSURE: 70 MMHG | SYSTOLIC BLOOD PRESSURE: 114 MMHG | RESPIRATION RATE: 16 BRPM | BODY MASS INDEX: 20.89 KG/M2 | HEART RATE: 66 BPM

## 2019-07-18 DIAGNOSIS — Z00.00 WELL ADULT EXAM: Primary | ICD-10-CM

## 2019-07-18 DIAGNOSIS — R20.2 ARM PARESTHESIA, LEFT: ICD-10-CM

## 2019-07-18 DIAGNOSIS — Z12.39 SCREENING FOR BREAST CANCER: ICD-10-CM

## 2019-07-18 DIAGNOSIS — F41.9 ANXIETY: ICD-10-CM

## 2019-07-18 DIAGNOSIS — N92.6 MENSTRUAL PROBLEM: ICD-10-CM

## 2019-07-18 DIAGNOSIS — M50.30 DDD (DEGENERATIVE DISC DISEASE), CERVICAL: ICD-10-CM

## 2019-07-18 PROCEDURE — 99396 PREV VISIT EST AGE 40-64: CPT | Performed by: FAMILY MEDICINE

## 2019-07-18 RX ORDER — HYDROXYZINE HYDROCHLORIDE 25 MG/1
25 TABLET, FILM COATED ORAL 3 TIMES DAILY PRN
Qty: 30 TABLET | Refills: 0 | Status: SHIPPED | OUTPATIENT
Start: 2019-07-18 | End: 2019-08-08 | Stop reason: SDUPTHER

## 2019-07-18 RX ORDER — HYDROCODONE BITARTRATE AND ACETAMINOPHEN 5; 325 MG/1; MG/1
1 TABLET ORAL
COMMUNITY
End: 2019-07-18 | Stop reason: ALTCHOICE

## 2019-07-18 RX ORDER — HYDROCODONE BITARTRATE AND ACETAMINOPHEN 5; 325 MG/1; MG/1
1 TABLET ORAL EVERY 6 HOURS PRN
Qty: 28 TABLET | Refills: 0 | Status: SHIPPED | OUTPATIENT
Start: 2019-07-18 | End: 2019-07-25

## 2019-07-18 RX ORDER — PAROXETINE HYDROCHLORIDE 20 MG/1
20 TABLET, FILM COATED ORAL EVERY MORNING
Qty: 30 TABLET | Refills: 11 | Status: SHIPPED | OUTPATIENT
Start: 2019-07-18 | End: 2020-07-30

## 2019-07-18 ASSESSMENT — ENCOUNTER SYMPTOMS
CHEST TIGHTNESS: 0
ABDOMINAL PAIN: 0
VOMITING: 0
DIARRHEA: 0
WHEEZING: 0
CONSTIPATION: 0
RHINORRHEA: 0
BLOOD IN STOOL: 0
SHORTNESS OF BREATH: 0
BACK PAIN: 0
NAUSEA: 0
COUGH: 0
SORE THROAT: 0
EYE PAIN: 0

## 2019-07-18 NOTE — PROGRESS NOTES
Subjective:      Patient ID: Henrietta Dickey is a 46 y.o. female. HPI  Pt here for a  Wellness check up. Reviewed BMI of 21. Encouraged diet, exercise and weight loss. Reviewed health maintenance. Needs to see OB GYN, needs mammogram.  Continues with anxiety issues is on paxil 20 mg .  , current smoker, pmh reviewed. Review of Systems   Constitutional: Negative for chills, fatigue, fever and unexpected weight change. HENT: Negative for congestion, ear pain, rhinorrhea and sore throat. Eyes: Negative for pain and visual disturbance. Respiratory: Negative for cough, chest tightness, shortness of breath and wheezing. Cardiovascular: Negative for chest pain and palpitations. Gastrointestinal: Negative for abdominal pain, blood in stool, constipation, diarrhea, nausea and vomiting. Genitourinary: Positive for menstrual problem. Negative for difficulty urinating, frequency, hematuria and urgency. Musculoskeletal: Negative for back pain, joint swelling, myalgias and neck pain. Skin: Negative for rash. Neurological: Negative for dizziness and headaches. Hematological: Negative for adenopathy. Does not bruise/bleed easily. Psychiatric/Behavioral: Negative for behavioral problems and sleep disturbance. The patient is nervous/anxious. Objective:   Physical Exam   Constitutional: She is oriented to person, place, and time. She appears well-developed and well-nourished. HENT:   Head: Normocephalic and atraumatic. Right Ear: External ear normal.   Left Ear: External ear normal.   Nose: Nose normal.   Mouth/Throat: Oropharynx is clear and moist.   Eyes: Pupils are equal, round, and reactive to light. EOM are normal.   Neck: Neck supple. Carotid bruit is not present. No thyromegaly present. Cardiovascular: Normal rate, regular rhythm and normal heart sounds. Pulmonary/Chest: Breath sounds normal. She has no wheezes. She has no rales. Abdominal: Soft.  Bowel sounds are Sharyle Kelly, MD

## 2019-07-18 NOTE — PATIENT INSTRUCTIONS
anxious, say to yourself, \"This is not an emergency. I feel uncomfortable, but I am not in danger. I can keep going even if I feel anxious. \"  · Be kind to your body:  ? Relieve tension with exercise or a massage. ? Get enough rest.  ? Avoid alcohol, caffeine, nicotine, and illegal drugs. They can increase your anxiety level and cause sleep problems. ? Learn and do relaxation techniques. See below for more about these techniques. · Engage your mind. Get out and do something you enjoy. Go to a [employer] movie, or take a walk or hike. Plan your day. Having too much or too little to do can make you anxious. · Keep a record of your symptoms. Discuss your fears with a good friend or family member, or join a support group for people with similar problems. Talking to others sometimes relieves stress. · Get involved in social groups, or volunteer to help others. Being alone sometimes makes things seem worse than they are. · Get at least 30 minutes of exercise on most days of the week to relieve stress. Walking is a good choice. You also may want to do other activities, such as running, swimming, cycling, or playing tennis or team sports. Relaxation techniques  Do relaxation exercises 10 to 20 minutes a day. You can play soothing, relaxing music while you do them, if you wish. · Tell others in your house that you are going to do your relaxation exercises. Ask them not to disturb you. · Find a comfortable place, away from all distractions and noise. · Lie down on your back, or sit with your back straight. · Focus on your breathing. Make it slow and steady. · Breathe in through your nose. Breathe out through either your nose or mouth. · Breathe deeply, filling up the area between your navel and your rib cage. Breathe so that your belly goes up and down. · Do not hold your breath. · Breathe like this for 5 to 10 minutes. Notice the feeling of calmness throughout your whole body.   As you continue to breathe slowly and questions about a medical condition or this instruction, always ask your healthcare professional. [address] any warranty or liability for your use of this information. Patient Education        Learning About Benefits From Quitting Smoking  How does quitting smoking make you healthier? If you're thinking about quitting smoking, you may have a few reasons to be smoke-free. Your health may be one of them. · When you quit smoking, you lower your risks for cancer, lung disease, heart attack, stroke, blood vessel disease, and blindness from macular degeneration. · When you're smoke-free, you get sick less often, and you heal faster. You are less likely to get colds, flu, bronchitis, and pneumonia. · As a nonsmoker, you may find that your mood is better and you are less stressed. When and how will you feel healthier? Quitting has real health benefits that start from day 1 of being smoke-free. And the longer you stay smoke-free, the healthier you get and the better you feel. The first hours  · After just 20 minutes, your blood pressure and heart rate go down. That means there's less stress on your heart and blood vessels. · Within 12 hours, the level of carbon monoxide in your blood drops back to normal. That makes room for more oxygen. With more oxygen in your body, you may notice that you have more energy than when you smoked. After 2 weeks  · Your lungs start to work better. · Your risk of heart attack starts to drop. After 1 month  · When your lungs are clear, you cough less and breathe deeper, so it's easier to be active. · Your sense of taste and smell return. That means you can enjoy food more than you have since you started smoking. Over the years  · After 1 year, your risk of heart disease is half what it would be if you kept smoking. · After 5 years, your risk of stroke starts to shrink.  Within a few years after that, it's about the same as if you'd never

## 2019-07-31 ENCOUNTER — OFFICE VISIT (OUTPATIENT)
Dept: FAMILY MEDICINE CLINIC | Age: 54
End: 2019-07-31
Payer: COMMERCIAL

## 2019-07-31 VITALS
DIASTOLIC BLOOD PRESSURE: 66 MMHG | RESPIRATION RATE: 14 BRPM | HEART RATE: 132 BPM | SYSTOLIC BLOOD PRESSURE: 126 MMHG | WEIGHT: 129 LBS | BODY MASS INDEX: 20.51 KG/M2

## 2019-07-31 DIAGNOSIS — F41.9 ANXIETY: Primary | ICD-10-CM

## 2019-07-31 PROCEDURE — 99213 OFFICE O/P EST LOW 20 MIN: CPT | Performed by: FAMILY MEDICINE

## 2019-07-31 ASSESSMENT — ENCOUNTER SYMPTOMS
SHORTNESS OF BREATH: 0
BLOOD IN STOOL: 0
DIARRHEA: 0
ABDOMINAL PAIN: 0
VOMITING: 0
EYE PAIN: 0
SORE THROAT: 0
BACK PAIN: 0
RHINORRHEA: 0
CHEST TIGHTNESS: 0
CONSTIPATION: 0
WHEEZING: 0
COUGH: 0
NAUSEA: 0

## 2019-07-31 NOTE — PROGRESS NOTES
no tenderness. There is no rebound and no guarding. Musculoskeletal: Normal range of motion. She exhibits no edema. Lymphadenopathy:     She has no cervical adenopathy. Neurological: She is alert and oriented to person, place, and time. She has normal reflexes. No cranial nerve deficit. Skin: Skin is warm and dry. No rash noted. Psychiatric: Her mood appears anxious. She is agitated. Nursing note and vitals reviewed.       Assessment:      Anxiety      Plan:      Continue the paxil, take routinely  Work slip         Elder James MD

## 2019-08-08 DIAGNOSIS — F41.9 ANXIETY: ICD-10-CM

## 2019-08-08 RX ORDER — HYDROXYZINE HYDROCHLORIDE 25 MG/1
25 TABLET, FILM COATED ORAL 3 TIMES DAILY PRN
Qty: 30 TABLET | Refills: 0 | Status: SHIPPED | OUTPATIENT
Start: 2019-08-08 | End: 2019-08-23 | Stop reason: SDUPTHER

## 2019-08-23 DIAGNOSIS — F41.9 ANXIETY: ICD-10-CM

## 2019-08-23 RX ORDER — HYDROXYZINE HYDROCHLORIDE 25 MG/1
25 TABLET, FILM COATED ORAL 3 TIMES DAILY PRN
Qty: 30 TABLET | Refills: 0 | Status: SHIPPED | OUTPATIENT
Start: 2019-08-23 | End: 2019-09-06 | Stop reason: SDUPTHER

## 2019-09-06 DIAGNOSIS — F41.9 ANXIETY: ICD-10-CM

## 2019-09-06 RX ORDER — HYDROXYZINE HYDROCHLORIDE 25 MG/1
25 TABLET, FILM COATED ORAL 3 TIMES DAILY PRN
Qty: 30 TABLET | Refills: 1 | Status: SHIPPED | OUTPATIENT
Start: 2019-09-06 | End: 2020-12-16 | Stop reason: ALTCHOICE

## 2019-09-06 NOTE — TELEPHONE ENCOUNTER
Patient called to request a refill of her Hydroxyzine. She says that she does not have enough to get her through the weekend. She uses [facility].  Please call her back at [phone] only if any problems

## 2019-09-24 ENCOUNTER — TELEPHONE (OUTPATIENT)
Dept: FAMILY MEDICINE CLINIC | Age: 54
End: 2019-09-24

## 2020-01-10 ENCOUNTER — OFFICE VISIT (OUTPATIENT)
Dept: FAMILY MEDICINE CLINIC | Age: 55
End: 2020-01-10
Payer: COMMERCIAL

## 2020-01-10 VITALS
WEIGHT: 135.2 LBS | SYSTOLIC BLOOD PRESSURE: 112 MMHG | HEART RATE: 100 BPM | BODY MASS INDEX: 21.22 KG/M2 | TEMPERATURE: 98.1 F | DIASTOLIC BLOOD PRESSURE: 74 MMHG | RESPIRATION RATE: 14 BRPM | HEIGHT: 67 IN

## 2020-01-10 PROCEDURE — 99213 OFFICE O/P EST LOW 20 MIN: CPT | Performed by: FAMILY MEDICINE

## 2020-01-10 RX ORDER — AMOXICILLIN AND CLAVULANATE POTASSIUM 875; 125 MG/1; MG/1
1 TABLET, FILM COATED ORAL 2 TIMES DAILY
Qty: 20 TABLET | Refills: 0 | Status: SHIPPED | OUTPATIENT
Start: 2020-01-10 | End: 2020-01-20

## 2020-01-10 ASSESSMENT — ENCOUNTER SYMPTOMS
HEMOPTYSIS: 0
SORE THROAT: 0
CHEST TIGHTNESS: 0
ABDOMINAL PAIN: 0
RHINORRHEA: 1
SINUS PRESSURE: 1
DIARRHEA: 0
BACK PAIN: 0
EYE PAIN: 0
NAUSEA: 0
SINUS PAIN: 1
BLOOD IN STOOL: 0
COUGH: 1
VOMITING: 0
HEARTBURN: 0
CONSTIPATION: 0
SHORTNESS OF BREATH: 0
WHEEZING: 0

## 2020-01-10 ASSESSMENT — PATIENT HEALTH QUESTIONNAIRE - PHQ9
SUM OF ALL RESPONSES TO PHQ QUESTIONS 1-9: 0
2. FEELING DOWN, DEPRESSED OR HOPELESS: 0
SUM OF ALL RESPONSES TO PHQ9 QUESTIONS 1 & 2: 0
SUM OF ALL RESPONSES TO PHQ QUESTIONS 1-9: 0
1. LITTLE INTEREST OR PLEASURE IN DOING THINGS: 0

## 2020-01-10 NOTE — LETTER
[address] [place] [address]  Phone: [phone]  Fax: [phone]    Amarjit Vincent MD        January 10, 2020     Patient: Melisa Wiley   YOB: 1965   Date of Visit: 1/10/2020       To Whom It May Concern: It is my medical opinion that Wendy Gan may return to work on 01/10/2020.  8 hour per work day restriction through 01/17/2020. If you have any questions or concerns, please don't hesitate to call.     Sincerely,        Amarjit Vincent MD

## 2020-01-10 NOTE — PROGRESS NOTES
tenderness present. Left Sinus: Maxillary sinus tenderness present. Mouth/Throat:      Mouth: Mucous membranes are moist.   Eyes:      Pupils: Pupils are equal, round, and reactive to light. Neck:      Musculoskeletal: Neck supple. Thyroid: No thyromegaly. Cardiovascular:      Rate and Rhythm: Normal rate and regular rhythm. Heart sounds: Normal heart sounds. Pulmonary:      Breath sounds: Normal breath sounds. No wheezing or rales. Abdominal:      General: Bowel sounds are normal.      Palpations: Abdomen is soft. Tenderness: There is no tenderness. There is no guarding or rebound. Musculoskeletal: Normal range of motion. Lymphadenopathy:      Cervical: No cervical adenopathy. Skin:     General: Skin is warm and dry. Findings: No rash. Neurological:      Mental Status: She is alert and oriented to person, place, and time. Cranial Nerves: No cranial nerve deficit. Deep Tendon Reflexes: Reflexes are normal and symmetric.          Assessment:      Acute maxillary sinusitis      Plan:      Augmentin 875 mg BID x 10 days  Work restriction        Meli Rosas MD

## 2020-04-02 ENCOUNTER — NURSE TRIAGE (OUTPATIENT)
Dept: OTHER | Facility: CLINIC | Age: 55
End: 2020-04-02

## 2020-07-15 ENCOUNTER — OFFICE VISIT (OUTPATIENT)
Dept: FAMILY MEDICINE CLINIC | Age: 55
End: 2020-07-15
Payer: COMMERCIAL

## 2020-07-15 VITALS
SYSTOLIC BLOOD PRESSURE: 122 MMHG | RESPIRATION RATE: 14 BRPM | WEIGHT: 139 LBS | HEART RATE: 94 BPM | BODY MASS INDEX: 22.1 KG/M2 | DIASTOLIC BLOOD PRESSURE: 72 MMHG | TEMPERATURE: 98.6 F

## 2020-07-15 PROCEDURE — 99213 OFFICE O/P EST LOW 20 MIN: CPT | Performed by: FAMILY MEDICINE

## 2020-07-15 ASSESSMENT — ENCOUNTER SYMPTOMS
NAUSEA: 0
SORE THROAT: 0
BLOOD IN STOOL: 0
ABDOMINAL PAIN: 0
CHEST TIGHTNESS: 0
CONSTIPATION: 0
COUGH: 0
WHEEZING: 0
VOMITING: 0
SHORTNESS OF BREATH: 0
RHINORRHEA: 0
DIARRHEA: 0
BACK PAIN: 1
EYE PAIN: 0

## 2020-07-15 NOTE — PATIENT INSTRUCTIONS
Patient Education        Cervical Spinal Stenosis: Care Instructions  Your Care Instructions     Spinal stenosis is a narrowing of the canal that surrounds the spinal cord and nerve roots. Sometimes bone and other tissue grow into this canal and press on the nerves that branch out from the spinal cord. This can happen as a part of aging. When the narrowing happens in your neck, it's called cervical spinal stenosis. It often causes stiffness, pain, numbness, and weakness in the neck, shoulders, arms, hands, or legs. It can even cause problems with your balance, coordination, and bowel or bladder control. But some people have no symptoms. You may be able to get relief from the symptoms of spinal stenosis by taking pain medicine. Your doctor may suggest physical therapy and exercises to keep your spine strong and flexible. Some people try steroid shots to reduce swelling. If pain and numbness in your neck, arms, or legs are still so bad that you cannot do your normal activities, you may need surgery. Follow-up care is a key part of your treatment and safety. Be sure to make and go to all appointments, and call your doctor if you are having problems. It's also a good idea to know your test results and keep a list of the medicines you take. How can you care for yourself at home? · Ask your doctor if you can take an over-the-counter pain medicine, such as acetaminophen (Tylenol), ibuprofen (Advil, Motrin), or naproxen (Aleve). Be safe with medicines. Read and follow all instructions on the label. · Do not take two or more pain medicines at the same time unless the doctor told you to. Many pain medicines have acetaminophen, which is Tylenol. Too much acetaminophen (Tylenol) can be harmful. · Change positions often when you are standing or sitting. This may reduce pressure on the spinal cord and its nerves.   · When you rest, use pillows or towel rolls to support your neck and head in a comfortable position. · Follow your doctor's instructions about activity. He or she may tell you not to do sports or activities that could injure your neck. · Stretch your neck and shoulders as your doctor or physical therapist recommends. If your doctor says it is okay to do them, these exercises may help:  ? Neck stretches to the side. Keep your shoulders relaxed and slowly tilt your head straight over toward one shoulder. Hold for 15 seconds. Let the weight of your head stretch your muscles. Then do the same toward the other shoulder. ? Neck rotations. Keep your chin level and slowly turn your head to one side. Hold for 15 seconds. Then do the same to the other side. ? Shoulder rolls. Roll your shoulders up, then back, and then down in a smooth, circular motion. Repeat several times. When should you call for help? [id number] anytime you think you may need emergency care. For example, call if:  · You are unable to move an arm or a leg at all. Call your doctor now or seek immediate medical care if:  · You have new or worse symptoms in your arms, legs, belly, or buttocks. Symptoms may include:  ? Numbness or tingling. ? Weakness. ? Pain. · You lose bladder or bowel control. Watch closely for changes in your health, and be sure to contact your doctor if:  · You do not get better as expected. Where can you learn more? Go to https://OKDJ.fmilaeb.[employer]. org and sign in to your PageBites account. Enter  in the [facility] box to learn more about \"Cervical Spinal Stenosis: Care Instructions. \"     If you do not have an account, please click on the \"Sign Up Now\" link. Current as of: March 2, 2020               Content Version: 12.5  © 1795-8401 [employer]. Care instructions adapted under license by [address]  If you have questions about a medical condition or this instruction, always ask your healthcare professional. [address]  any warranty or liability for your use of this information. Patient Education        Anxiety Disorder: Care Instructions  Your Care Instructions     Anxiety is a normal reaction to stress. Difficult situations can cause you to have symptoms such as sweaty palms and a nervous feeling. In an anxiety disorder, the symptoms are far more severe. Constant worry, muscle tension, trouble sleeping, nausea and diarrhea, and other symptoms can make normal daily activities difficult or impossible. These symptoms may occur for no reason, and they can affect your work, school, or social life. Medicines, counseling, and self-care can all help. Follow-up care is a key part of your treatment and safety. Be sure to make and go to all appointments, and call your doctor if you are having problems. It's also a good idea to know your test results and keep a list of the medicines you take. How can you care for yourself at home? · Take medicines exactly as directed. Call your doctor if you think you are having a problem with your medicine. · Go to your counseling sessions and follow-up appointments. · Recognize and accept your anxiety. Then, when you are in a situation that makes you anxious, say to yourself, \"This is not an emergency. I feel uncomfortable, but I am not in danger. I can keep going even if I feel anxious. \"  · Be kind to your body:  ? Relieve tension with exercise or a massage. ? Get enough rest.  ? Avoid alcohol, caffeine, nicotine, and illegal drugs. They can increase your anxiety level and cause sleep problems. ? Learn and do relaxation techniques. See below for more about these techniques. · Engage your mind. Get out and do something you enjoy. Go to a funny movie, or take a walk or hike. Plan your day. Having too much or too little to do can make you anxious. · Keep a record of your symptoms. Discuss your fears with a good friend or family member, or join a support group for people with similar problems.  Talking to others sometimes relieves stress. · Get involved in social groups, or volunteer to help others. Being alone sometimes makes things seem worse than they are. · Get at least 30 minutes of exercise on most days of the week to relieve stress. Walking is a good choice. You also may want to do other activities, such as running, swimming, cycling, or playing tennis or team sports. Relaxation techniques  Do relaxation exercises 10 to 20 minutes a day. You can play soothing, relaxing music while you do them, if you wish. · Tell others in your house that you are going to do your relaxation exercises. Ask them not to disturb you. · Find a comfortable place, away from all distractions and noise. · Lie down on your back, or sit with your back straight. · Focus on your breathing. Make it slow and steady. · Breathe in through your nose. Breathe out through either your nose or mouth. · Breathe deeply, filling up the area between your navel and your rib cage. Breathe so that your belly goes up and down. · Do not hold your breath. · Breathe like this for 5 to 10 minutes. Notice the feeling of calmness throughout your whole body. As you continue to breathe slowly and deeply, relax by doing the following for another 5 to 10 minutes:  · Tighten and relax each muscle group in your body. You can begin at your toes and work your way up to your head. · Imagine your muscle groups relaxing and becoming heavy. · Empty your mind of all thoughts. · Let yourself relax more and more deeply. · Become aware of the state of calmness that surrounds you. · When your relaxation time is over, you can bring yourself back to alertness by moving your fingers and toes and then your hands and feet and then stretching and moving your entire body. Sometimes people fall asleep during relaxation, but they usually wake up shortly afterward.   · Always give yourself time to return to full alertness before you drive a car or do anything that might cause an accident if you are not fully alert. Never play a relaxation tape while you drive a car. When should you call for help? [id number] anytime you think you may need emergency care. For example, call if:  · You feel you cannot stop from hurting yourself or someone else. Keep the numbers for these national suicide hotlines: 1-684-987-TALK (0-301.641.6953) and 0-336-AESZPOC (4-213.521.8153). If you or someone you know talks about suicide or feeling hopeless, get help right away. Watch closely for changes in your health, and be sure to contact your doctor if:  · You have anxiety or fear that affects your life. · You have symptoms of anxiety that are new or different from those you had before. Where can you learn more? Go to https://SchoolEdge Mobilepealtagraciaeweb.[employer]inc. org and sign in to your [employer] account. Enter P754 in the [employer]. box to learn more about \"Anxiety Disorder: Care Instructions. \"     If you do not have an account, please click on the \"Sign Up Now\" link. Current as of: January 31, 2020               Content Version: 12.5  © 8472-3226 [employer]. Care instructions adapted under license by [facility] ([facility]). If you have questions about a medical condition or this instruction, always ask your healthcare professional. [address]  any warranty or liability for your use of this information.

## 2020-07-15 NOTE — LETTER
[address] [place] [address]  Phone: [phone]  Fax: [phone]    Courtney Jade MD        July 15, 2020     Patient: Aida Nesbitt   YOB: 1965   Date of Visit: 7/15/2020       To Whom It May Concern: It is my medical opinion that Elsa Andreea be restricted to 8 hour scheduled shifts through 08/16/2020 due to cervical spinal stenosis and anxiety. If you have any questions or concerns, please don't hesitate to call.     Sincerely,        Courtney Jade MD

## 2020-07-15 NOTE — PROGRESS NOTES
Subjective:      Patient ID: Will Gavin is a 53 y.o. female. HPI  Pt here for a check up. Reviewed BMI of 22. Normal.  Reviewed health maintenance. Pt feels she cannot work 12 hour shifts. Back pain and anxiety. , current smoker, pmh reviewed. Review of Systems   Constitutional: Negative for chills, fatigue, fever and unexpected weight change. HENT: Negative for congestion, ear pain, rhinorrhea and sore throat. Eyes: Negative for pain and visual disturbance. Respiratory: Negative for cough, chest tightness, shortness of breath and wheezing. Cardiovascular: Negative for chest pain and palpitations. Gastrointestinal: Negative for abdominal pain, blood in stool, constipation, diarrhea, nausea and vomiting. Genitourinary: Negative for difficulty urinating, frequency, hematuria and urgency. Musculoskeletal: Positive for back pain. Negative for joint swelling, myalgias and neck pain. Skin: Negative for rash. Neurological: Negative for dizziness and headaches. Hematological: Negative for adenopathy. Does not bruise/bleed easily. Psychiatric/Behavioral: Negative for behavioral problems and sleep disturbance. The patient is nervous/anxious. Objective:   Physical Exam  Vitals signs and nursing note reviewed. Constitutional:       Appearance: She is well-developed. HENT:      Head: Normocephalic and atraumatic. Right Ear: External ear normal.      Left Ear: External ear normal.      Nose: Nose normal.      Mouth/Throat:      Mouth: Mucous membranes are moist.   Eyes:      Pupils: Pupils are equal, round, and reactive to light. Neck:      Musculoskeletal: Neck supple. Thyroid: No thyromegaly. Cardiovascular:      Rate and Rhythm: Normal rate and regular rhythm. Heart sounds: Normal heart sounds. Pulmonary:      Breath sounds: Normal breath sounds. No wheezing or rales.    Abdominal:      General: Bowel sounds are normal.      Palpations: Abdomen is soft. Tenderness: There is no abdominal tenderness. There is no guarding or rebound. Musculoskeletal: Normal range of motion. Cervical back: She exhibits tenderness. Back:    Lymphadenopathy:      Cervical: No cervical adenopathy. Skin:     General: Skin is warm and dry. Findings: No rash. Neurological:      Mental Status: She is alert and oriented to person, place, and time. Cranial Nerves: No cranial nerve deficit. Deep Tendon Reflexes: Reflexes are normal and symmetric. Health Maintenance   Topic Date Due    Pneumococcal 0-64 years Vaccine (1 of 1 - PPSV23) 07/19/1970    HIV screen  07/19/1979    DTaP/Tdap/Td vaccine (1 - Tdap) 07/19/1983    Cervical cancer screen  07/19/1985    Lipid screen  07/19/2004    Breast cancer screen  07/19/2014    Shingles Vaccine (1 of 2) 07/19/2014    Colon cancer screen colonoscopy  07/19/2014    Low dose CT lung screening  07/19/2019    Flu vaccine (1) 09/01/2020    Hepatitis C screen  Completed    Hepatitis A vaccine  Aged Out    Hepatitis B vaccine  Aged Out    Hib vaccine  Aged Out    Meningococcal (ACWY) vaccine  Aged Out       Assessment:      Cervical spinal stenosis  Anxiety      Plan:      Letter for work.   30 days restriction        Haley Regalado MD

## 2020-07-30 RX ORDER — PAROXETINE HYDROCHLORIDE 20 MG/1
TABLET, FILM COATED ORAL
Qty: 30 TABLET | Refills: 0 | Status: SHIPPED | OUTPATIENT
Start: 2020-07-30 | End: 2020-12-16 | Stop reason: ALTCHOICE

## 2020-08-12 ENCOUNTER — TELEPHONE (OUTPATIENT)
Dept: FAMILY MEDICINE CLINIC | Age: 55
End: 2020-08-12

## 2020-08-12 NOTE — LETTER
[address] [address]  Phone: [phone]  Fax: [phone]    Celeste Bynum MD        August 12, 2020     Patient: Samreen Wei   YOB: 1965           To Whom it May Concern: It is my medical opinion that Solomon Howard be restricted to 8 hour scheduled shifts through 09/16/2020 due to cervical spinal stenosis and anxiety. If you have any questions or concerns, please don't hesitate to call.     Sincerely,         Celeste Bynum MD

## 2020-08-12 NOTE — TELEPHONE ENCOUNTER
Paige Ou called to have you extend her work slip that you gave her for only working 8 hours instead of 12. She just can't do 12 with everything going on.

## 2020-12-01 ENCOUNTER — TELEPHONE (OUTPATIENT)
Dept: FAMILY MEDICINE CLINIC | Age: 55
End: 2020-12-01

## 2020-12-01 NOTE — LETTER
[address] [place] [address]  Dept: [phone]  Dept Fax: [phone] Semaj Chavez MD   12/1/2020      Frederic Sarabia  [address] [place] [address] Dr Maksim Cunha      Dear Brook Connolly records show that you are due or overdue for a colon cancer screening. Colorectal cancer is the second leading cause of cancer-related death in the [place]. The good news is that this disease can be prevented. Colon Cancer screening can detect precancerous polyps that can be removed with easy procedures. The [place] Preventative Services Task Force tells us that appropriately scheduled colon cancer screening reduces death from colorectal cancer, and strongly suggests screening for men and women ages 48 and older. I recommend that you elect one of the following two approved options:         1) A test that finds polyps and cancer - colonoscopy       2) A test that primarily finds cancer - yearly stool testing, with a colonoscopy for                    positive tests showing presence of blood in the bowels (FOBT or FIT)    The colonoscopy is preferable as it is able to identify and treat both pre-cancerous polyps and early forms of colon cancer. Please call our office so we can assist you in scheduling your colonoscopy. We look forward to hearing from you. If you already have had this done, we praise your attention to your health. Please call our office so we can update your records. Please make sure to let us know who performed your test and where it was done. We also welcome you to reach out to us online using ustyme. Ask us how!     Sincerely,    Lisa Arnold MD    www.cdc.gov

## 2020-12-01 NOTE — LETTER
Nikkie Mohr MD  [address] [facility] [place] [address]  Phone: [phone]  Fax: [phone]        December 1, 2020     [address] [facility] [address] [place] [address]      Dear [facility]:    Our records indicate that you are due for a mammogram.    In the [place], one in nine women will develop breast cancer during their lifetime. While there is no way to prevent breast cancer, early detection provides the best opportunity for curing it. For women over the age of 36, the [facility] recommends a yearly clinical breast exam and a yearly mammogram. These practices have saved thousands of lives. We need your help to ensure that you are receiving optimal medical care. Please make an appointment for a mammogram at your earliest convenience.     Sincerely,        Nikkie Mohr MD

## 2020-12-16 ENCOUNTER — OFFICE VISIT (OUTPATIENT)
Dept: FAMILY MEDICINE CLINIC | Age: 55
End: 2020-12-16
Payer: COMMERCIAL

## 2020-12-16 VITALS
DIASTOLIC BLOOD PRESSURE: 72 MMHG | TEMPERATURE: 96.8 F | RESPIRATION RATE: 14 BRPM | WEIGHT: 138 LBS | SYSTOLIC BLOOD PRESSURE: 118 MMHG | HEART RATE: 70 BPM | BODY MASS INDEX: 21.94 KG/M2

## 2020-12-16 PROCEDURE — 99214 OFFICE O/P EST MOD 30 MIN: CPT | Performed by: FAMILY MEDICINE

## 2020-12-16 RX ORDER — SUCRALFATE 1 G/1
1 TABLET ORAL 4 TIMES DAILY
Qty: 120 TABLET | Refills: 1 | Status: ON HOLD | OUTPATIENT
Start: 2020-12-16 | End: 2021-03-05 | Stop reason: ALTCHOICE

## 2020-12-16 RX ORDER — CITALOPRAM 20 MG/1
20 TABLET ORAL DAILY
Qty: 30 TABLET | Refills: 2 | Status: SHIPPED | OUTPATIENT
Start: 2020-12-16 | End: 2021-02-09 | Stop reason: SDUPTHER

## 2020-12-16 RX ORDER — PANTOPRAZOLE SODIUM 40 MG/1
40 TABLET, DELAYED RELEASE ORAL
Qty: 30 TABLET | Refills: 1 | Status: ON HOLD | OUTPATIENT
Start: 2020-12-16 | End: 2021-03-05 | Stop reason: ALTCHOICE

## 2020-12-16 ASSESSMENT — ENCOUNTER SYMPTOMS
SHORTNESS OF BREATH: 0
BACK PAIN: 0
CONSTIPATION: 0
NAUSEA: 1
RHINORRHEA: 0
DIARRHEA: 0
CHEST TIGHTNESS: 0
ABDOMINAL PAIN: 1
BLOOD IN STOOL: 0
SORE THROAT: 0
WHEEZING: 0
VOMITING: 1
EYE PAIN: 0
COUGH: 0

## 2020-12-16 NOTE — LETTER
[address] [place] [id number]  Phone: [phone]  Fax: [phone]    Faina Hinds MD        December 16, 2020     Patient: Radha Valdez   YOB: 1965   Date of Visit: 12/16/2020       To Whom It May Concern: It is my medical opinion that Shorty Varghese may return to work on 12/19/2020. Off work 12/16/2020 through 12/18/2020 for epigastric abdominal pain. If you have any questions or concerns, please don't hesitate to call.     Sincerely,        Faina Hinds MD

## 2020-12-16 NOTE — PROGRESS NOTES
Subjective:      Patient ID: Stanton Andres is a 63 y.o. female. HPI  Pt here for a check up. Reviewed BMI of 22. Normal.  Encouraged diet, exercise and weight loss. Longterm abdominal pain. Worse the last 2 weeks. Food makes it worse, gets sick after eating. Tried pepcid, seems to help. , current smoker, pmh reviewed,. Review of Systems   Constitutional: Negative for chills, fatigue, fever and unexpected weight change. HENT: Negative for congestion, ear pain, rhinorrhea and sore throat. Eyes: Negative for pain and visual disturbance. Respiratory: Negative for cough, chest tightness, shortness of breath and wheezing. Cardiovascular: Negative for chest pain and palpitations. Gastrointestinal: Positive for abdominal pain, nausea and vomiting. Negative for blood in stool, constipation and diarrhea. Genitourinary: Negative for difficulty urinating, frequency, hematuria and urgency. Musculoskeletal: Negative for back pain, joint swelling, myalgias and neck pain. Skin: Negative for rash. Neurological: Negative for dizziness and headaches. Hematological: Negative for adenopathy. Does not bruise/bleed easily. Psychiatric/Behavioral: Negative for behavioral problems and sleep disturbance. The patient is not nervous/anxious. Objective:   Physical Exam  Vitals signs and nursing note reviewed. Constitutional:       Appearance: She is well-developed. HENT:      Head: Normocephalic and atraumatic. Right Ear: External ear normal.      Left Ear: External ear normal.      Nose: Nose normal.      Mouth/Throat:      Mouth: Mucous membranes are moist.   Eyes:      Pupils: Pupils are equal, round, and reactive to light. Neck:      Musculoskeletal: Neck supple. Thyroid: No thyromegaly. Vascular: No carotid bruit. Cardiovascular:      Rate and Rhythm: Normal rate and regular rhythm. Heart sounds: Normal heart sounds.    Pulmonary:      Breath sounds: Normal breath sounds. No wheezing or rales. Abdominal:      General: Bowel sounds are normal.      Palpations: Abdomen is soft. Tenderness: There is abdominal tenderness in the epigastric area. There is no guarding or rebound. Musculoskeletal: Normal range of motion. Lymphadenopathy:      Cervical: No cervical adenopathy. Skin:     General: Skin is warm and dry. Findings: No rash. Neurological:      Mental Status: She is alert and oriented to person, place, and time. Cranial Nerves: No cranial nerve deficit. Deep Tendon Reflexes: Reflexes are normal and symmetric. Health Maintenance   Topic Date Due    Pneumococcal 0-64 years Vaccine (1 of 1 - PPSV23) 07/19/1970    HIV screen  07/19/1979    DTaP/Tdap/Td vaccine (1 - Tdap) 07/19/1983    Cervical cancer screen  07/19/1985    Lipid screen  07/19/2004    Breast cancer screen  07/19/2014    Shingles Vaccine (1 of 2) 07/19/2014    Colon cancer screen colonoscopy  07/19/2014    Low dose CT lung screening  07/19/2019    Flu vaccine (1) 09/01/2020    Hepatitis C screen  Completed    Hepatitis A vaccine  Aged Out    Hepatitis B vaccine  Aged Out    Hib vaccine  Aged Out    Meningococcal (ACWY) vaccine  Aged Out       Assessment:      Epigastric abdominal pain--gastritis vs ulcer  Nicotine dependence  Anxiety      Plan:      Protonix 40 mg daily  [place] diet  carafate 1 gm QID  Consider GI consult-declines at this time.     Encourage smoking cessation  Citalopram 20 mg daily        Selene Oswald MD

## 2020-12-16 NOTE — PATIENT INSTRUCTIONS
Patient Education        Abdominal Pain: Care Instructions  Your Care Instructions     Abdominal pain has many possible causes. Some aren't serious and get better on their own in a few days. Others need more testing and treatment. If your pain continues or gets worse, you need to be rechecked and may need more tests to find out what is wrong. You may need surgery to correct the problem. Don't ignore new symptoms, such as fever, nausea and vomiting, urination problems, pain that gets worse, and dizziness. These may be signs of a more serious problem. Your doctor may have recommended a follow-up visit in the next 8 to 12 hours. If you are not getting better, you may need more tests or treatment. The doctor has checked you carefully, but problems can develop later. If you notice any problems or new symptoms, get medical treatment right away. Follow-up care is a key part of your treatment and safety. Be sure to make and go to all appointments, and call your doctor if you are having problems. It's also a good idea to know your test results and keep a list of the medicines you take. How can you care for yourself at home? · Rest until you feel better. · To prevent dehydration, drink plenty of fluids, enough so that your urine is light yellow or clear like water. Choose water and other caffeine-free clear liquids until you feel better. If you have kidney, heart, or liver disease and have to limit fluids, talk with your doctor before you increase the amount of fluids you drink. · If your stomach is upset, eat mild foods, such as rice, dry toast or crackers, bananas, and applesauce. Try eating several small meals instead of two or three large ones. · Wait until 48 hours after all symptoms have gone away before you have spicy foods, alcohol, and drinks that contain caffeine. · Do not eat foods that are high in fat.   · Avoid anti-inflammatory medicines such as aspirin, ibuprofen (Advil, Motrin), and naproxen heartburn.) Most cases of an upset stomach with bloating, burning, burping, and nausea are minor and go away within several hours. Home treatment and over-the-counter medicine often are able to control symptoms. But if you take medicine to relieve your indigestion without making diet and lifestyle changes, your symptoms are likely to return again and again. If you get indigestion often, it may be a sign of a more serious medical problem. Be sure to follow up with your doctor, who may want to do tests to be sure of the cause of your indigestion. Follow-up care is a key part of your treatment and safety. Be sure to make and go to all appointments, and call your doctor if you are having problems. It's also a good idea to know your test results and keep a list of the medicines you take. How can you care for yourself at home? · Your doctor may recommend over-the-counter medicine. For mild or occasional indigestion, antacids such as Gaviscon, Mylanta, Maalox, or Tums, may help. Be safe with medicines. Be careful when you take over-the-counter antacid medicines. Many of these medicines have aspirin in them. Read the label to make sure that you are not taking more than the recommended dose. Too much aspirin can be harmful. · Your doctor also may recommend over-the-counter acid reducers, such as Pepcid AC (famotidine), Tagamet HB (cimetidine), or Prilosec (omeprazole). Read and follow all instructions on the label. If you use these medicines often, talk with your doctor. · Change your eating habits. ? It's best to eat several small meals instead of two or three large meals. ? After you eat, wait 2 to 3 hours before you lie down. ? Chocolate, mint, and alcohol can make GERD worse. ? Spicy foods, foods that have a lot of acid (like tomatoes and oranges), and coffee can make GERD symptoms worse in some people.  If your symptoms are worse after you eat a certain food, you may want to stop eating that food to see if your symptoms get better. · Do not smoke or chew tobacco. Smoking can make GERD worse. If you need help quitting, talk to your doctor about stop-smoking programs and medicines. These can increase your chances of quitting for good. · If you have GERD symptoms at night, raise the head of your bed 6 to 8 inches. You can do this by putting the frame on blocks or placing a foam wedge under the head of your mattress. (Adding extra pillows does not work.)  · Do not wear tight clothing around your middle. · Lose weight if you need to. Losing just 5 to 10 pounds can help. · Do not take anti-inflammatory medicines, such as aspirin, ibuprofen (Advil, Motrin), or naproxen (Aleve). These can irritate the stomach. If you need a pain medicine, try acetaminophen (Tylenol), which does not cause stomach upset. When should you call for help? Call your doctor now or seek immediate medical care if:    · You have new or worse belly pain.     · You are vomiting. Watch closely for changes in your health, and be sure to contact your doctor if:    · You have new or worse symptoms of indigestion.     · You have trouble or pain swallowing.     · You are losing weight.     · You do not get better as expected. Where can you learn more? Go to https://Deepclass.MedAptus. org and sign in to your YES[employer]TAP account. Enter J686 in the [facility] box to learn more about \"Indigestion (Dyspepsia or Heartburn): Care Instructions. \"     If you do not have an account, please click on the \"Sign Up Now\" link. Current as of: April 15, 2020               Content Version: 12.6  © 1643-4029 [employer]. Care instructions adapted under license by [facility] ([facility]). If you have questions about a medical condition or this instruction, always ask your healthcare professional. [place] [address] any warranty or liability for your use of this information.          Patient Education        Learning About Benefits From Quitting Smoking  How does quitting smoking make you healthier? If you're thinking about quitting smoking, you may have a few reasons to be smoke-free. Your health may be one of them. · When you quit smoking, you lower your risks for cancer, lung disease, heart attack, stroke, blood vessel disease, and blindness from macular degeneration. · When you're smoke-free, you get sick less often, and you heal faster. You are less likely to get colds, flu, bronchitis, and pneumonia. · As a nonsmoker, you may find that your mood is better and you are less stressed. When and how will you feel healthier? Quitting has real health benefits that start from day 1 of being smoke-free. And the longer you stay smoke-free, the healthier you get and the better you feel. The first hours  · After just 20 minutes, your blood pressure and heart rate go down. That means there's less stress on your heart and blood vessels. · Within 12 hours, the level of carbon monoxide in your blood drops back to normal. That makes room for more oxygen. With more oxygen in your body, you may notice that you have more energy than when you smoked. After 2 weeks  · Your lungs start to work better. · Your risk of heart attack starts to drop. After 1 month  · When your lungs are clear, you cough less and breathe deeper, so it's easier to be active. · Your sense of taste and smell return. That means you can enjoy food more than you have since you started smoking. Over the years  · Over the years, your risks of heart disease, heart attack, and stroke are lower. · After 10 years, your risk of dying from lung cancer is cut by about half. And your risk for many other types of cancer is lower too. How would quitting help others in your life? When you quit smoking, you improve the health of everyone who now breathes in your smoke. · Their heart, lung, and cancer risks drop, much like yours. · They are sick less.  For babies and small children, living smoke-free means they're less likely to have ear infections, pneumonia, and bronchitis. · If you're a woman who is or will be pregnant someday, quitting smoking means a healthier . · Children who are close to you are less likely to become adult smokers. Where can you learn more? Go to https://chpepiceweb.healthWatchwith. org and sign in to your [employer] account. Enter 504 [id number] [id number] 11 in the [employer] box to learn more about \"Learning About Benefits From Quitting Smoking. \"     If you do not have an account, please click on the \"Sign Up Now\" link. Current as of: 2020               Content Version: 12.6  © 3703-3457 [employer]. Care instructions adapted under license by [facility] ([facility]). If you have questions about a medical condition or this instruction, always ask your healthcare professional. [address] any warranty or liability for your use of this information.

## 2021-02-08 ENCOUNTER — TELEPHONE (OUTPATIENT)
Dept: FAMILY MEDICINE CLINIC | Age: 56
End: 2021-02-08

## 2021-02-08 NOTE — TELEPHONE ENCOUNTER
Pt left , states she is having anxiety because her  is in the hospital and she would like [place] paperwork filled out.      Appointment with Dr Willian Weldon scheduled tomorrow morning at 8:00am.

## 2021-02-09 ENCOUNTER — OFFICE VISIT (OUTPATIENT)
Dept: FAMILY MEDICINE CLINIC | Age: 56
End: 2021-02-09
Payer: COMMERCIAL

## 2021-02-09 VITALS
HEART RATE: 86 BPM | BODY MASS INDEX: 22.5 KG/M2 | HEIGHT: 66 IN | DIASTOLIC BLOOD PRESSURE: 66 MMHG | TEMPERATURE: 96.6 F | WEIGHT: 140 LBS | SYSTOLIC BLOOD PRESSURE: 118 MMHG | RESPIRATION RATE: 12 BRPM

## 2021-02-09 DIAGNOSIS — N95.1 MENOPAUSAL SYMPTOMS: ICD-10-CM

## 2021-02-09 DIAGNOSIS — F41.9 ANXIETY: ICD-10-CM

## 2021-02-09 DIAGNOSIS — F33.1 MODERATE EPISODE OF RECURRENT MAJOR DEPRESSIVE DISORDER (HCC): Primary | ICD-10-CM

## 2021-02-09 DIAGNOSIS — Z12.31 ENCOUNTER FOR SCREENING MAMMOGRAM FOR MALIGNANT NEOPLASM OF BREAST: ICD-10-CM

## 2021-02-09 PROCEDURE — 99214 OFFICE O/P EST MOD 30 MIN: CPT | Performed by: FAMILY MEDICINE

## 2021-02-09 RX ORDER — HYDROXYZINE HYDROCHLORIDE 25 MG/1
25 TABLET, FILM COATED ORAL 3 TIMES DAILY PRN
Qty: 30 TABLET | Refills: 2 | Status: ON HOLD | OUTPATIENT
Start: 2021-02-09 | End: 2021-03-07 | Stop reason: SDUPTHER

## 2021-02-09 RX ORDER — CITALOPRAM 20 MG/1
20 TABLET ORAL DAILY
Qty: 30 TABLET | Refills: 5 | Status: ON HOLD | OUTPATIENT
Start: 2021-02-09 | End: 2021-03-07 | Stop reason: SDUPTHER

## 2021-02-09 RX ORDER — ESTROGEN,CON/M-PROGEST ACET 0.625-2.5
1 TABLET ORAL DAILY
Qty: 30 TABLET | Refills: 2 | Status: SHIPPED | OUTPATIENT
Start: 2021-02-09 | End: 2021-04-28 | Stop reason: SDUPTHER

## 2021-02-09 ASSESSMENT — ENCOUNTER SYMPTOMS
BLOOD IN STOOL: 0
EYE PAIN: 0
DIARRHEA: 0
SHORTNESS OF BREATH: 0
RHINORRHEA: 0
BACK PAIN: 0
VOMITING: 0
ABDOMINAL PAIN: 0
SORE THROAT: 0
COUGH: 0
WHEEZING: 0
CHEST TIGHTNESS: 0
CONSTIPATION: 0
NAUSEA: 0

## 2021-02-09 ASSESSMENT — PATIENT HEALTH QUESTIONNAIRE - PHQ9
SUM OF ALL RESPONSES TO PHQ QUESTIONS 1-9: 2
SUM OF ALL RESPONSES TO PHQ9 QUESTIONS 1 & 2: 2

## 2021-02-09 NOTE — PATIENT INSTRUCTIONS
Patient Education        Anxiety Disorder: Care Instructions  Your Care Instructions     Anxiety is a normal reaction to stress. Difficult situations can cause you to have symptoms such as sweaty palms and a nervous feeling. In an anxiety disorder, the symptoms are far more severe. Constant worry, muscle tension, trouble sleeping, nausea and diarrhea, and other symptoms can make normal daily activities difficult or impossible. These symptoms may occur for no reason, and they can affect your work, school, or social life. Medicines, counseling, and self-care can all help. Follow-up care is a key part of your treatment and safety. Be sure to make and go to all appointments, and call your doctor if you are having problems. It's also a good idea to know your test results and keep a list of the medicines you take. How can you care for yourself at home? · Take medicines exactly as directed. Call your doctor if you think you are having a problem with your medicine. · Go to your counseling sessions and follow-up appointments. · Recognize and accept your anxiety. Then, when you are in a situation that makes you anxious, say to yourself, \"This is not an emergency. I feel uncomfortable, but I am not in danger. I can keep going even if I feel anxious. \"  · Be kind to your body:  ? Relieve tension with exercise or a massage. ? Get enough rest.  ? Avoid alcohol, caffeine, nicotine, and illegal drugs. They can increase your anxiety level and cause sleep problems. ? Learn and do relaxation techniques. See below for more about these techniques. · Engage your mind. Get out and do something you enjoy. Go to a funny movie, or take a walk or hike. Plan your day. Having too much or too little to do can make you anxious. · Keep a record of your symptoms. Discuss your fears with a good friend or family member, or join a support group for people with similar problems. Talking to others sometimes relieves stress.   · Get involved in social groups, or volunteer to help others. Being alone sometimes makes things seem worse than they are. · Get at least 30 minutes of exercise on most days of the week to relieve stress. Walking is a good choice. You also may want to do other activities, such as running, swimming, cycling, or playing tennis or team sports. Relaxation techniques  Do relaxation exercises 10 to 20 minutes a day. You can play soothing, relaxing music while you do them, if you wish. · Tell others in your house that you are going to do your relaxation exercises. Ask them not to disturb you. · Find a comfortable place, away from all distractions and noise. · Lie down on your back, or sit with your back straight. · Focus on your breathing. Make it slow and steady. · Breathe in through your nose. Breathe out through either your nose or mouth. · Breathe deeply, filling up the area between your navel and your rib cage. Breathe so that your belly goes up and down. · Do not hold your breath. · Breathe like this for 5 to 10 minutes. Notice the feeling of calmness throughout your whole body. As you continue to breathe slowly and deeply, relax by doing the following for another 5 to 10 minutes:  · Tighten and relax each muscle group in your body. You can begin at your toes and work your way up to your head. · Imagine your muscle groups relaxing and becoming heavy. · Empty your mind of all thoughts. · Let yourself relax more and more deeply. · Become aware of the state of calmness that surrounds you. · When your relaxation time is over, you can bring yourself back to alertness by moving your fingers and toes and then your hands and feet and then stretching and moving your entire body. Sometimes people fall asleep during relaxation, but they usually wake up shortly afterward. · Always give yourself time to return to full alertness before you drive a car or do anything that might cause an accident if you are not fully alert.  Never play a relaxation tape while you drive a car. When should you call for help? Call 911 anytime you think you may need emergency care. For example, call if:    · You feel you cannot stop from hurting yourself or someone else. Keep the numbers for these national suicide hotlines: 2-449-448-TALK (1-828.999.5869) and 1-867-AUSTTXP (0-596.120.6688). If you or someone you know talks about suicide or feeling hopeless, get help right away. Watch closely for changes in your health, and be sure to contact your doctor if:    · You have anxiety or fear that affects your life.     · You have symptoms of anxiety that are new or different from those you had before. Where can you learn more? Go to https://DoostangpeSansaneb.CelePost. org and sign in to your ScoreStreak account. Enter P754 in the Here@ Networks box to learn more about \"Anxiety Disorder: Care Instructions. \"     If you do not have an account, please click on the \"Sign Up Now\" link. Current as of: January 31, 2020               Content Version: 12.6  © 8275-6228 [employer]. Care instructions adapted under license by [facility] ([facility]). If you have questions about a medical condition or this instruction, always ask your healthcare professional. [address] any warranty or liability for your use of this information. Patient Education        Depression Treatment: Care Instructions  Your Care Instructions     Depression is a condition that affects the way you feel, think, and act. It causes symptoms such as low energy, loss of interest in daily activities, and sadness or grouchiness that goes on for a long time. Depression is very common and affects men and women of all ages. Depression is a medical illness caused by changes in the natural chemicals in your brain. It is not a character flaw, and it does not mean that you are a bad or weak person. It does not mean that you are going crazy.   It is important to know that depression can be treated. Medicines, counseling, and self-care can all help. Many people do not get help because they are embarrassed or think that they will get over the depression on their own. But some people do not get better without treatment. Follow-up care is a key part of your treatment and safety. Be sure to make and go to all appointments, and call your doctor if you are having problems. It's also a good idea to know your test results and keep a list of the medicines you take. How can you care for yourself at home? Learn about antidepressant medicines  Antidepressant medicines can improve or end the symptoms of depression. You may need to take the medicine for at least 6 months, and often longer. Keep taking your medicine even if you feel better. If you stop taking it too soon, your symptoms may come back or get worse. You may start to feel better within 1 to 3 weeks of taking antidepressant medicine. But it can take as many as 6 to 8 weeks to see more improvement. Talk to your doctor if you have problems with your medicine or if you do not notice any improvement after 3 weeks. Antidepressants can make you feel tired, dizzy, or nervous. Some people have dry mouth, constipation, headaches, sexual problems, an upset stomach, or diarrhea. Many of these side effects are mild and go away on their own after you take the medicine for a few weeks. Some may last longer. Talk to your doctor if side effects bother you too much. You might be able to try a different medicine. If you are pregnant or breastfeeding, talk to your doctor about what medicines you can take. Learn about counseling  In many cases, counseling can work as well as medicines to treat mild to moderate depression. Counseling is done by licensed mental health providers, such as psychologists, social workers, and some types of nurses. It can be done in one-on-one sessions or in a group setting.  Many people find group sessions helpful. Cognitive-behavioral therapy is a type of counseling. In this treatment therapy, you learn how to see and change unhelpful thinking styles that may be adding to your depression. Counseling and medicines often work well when used together. Here are other things you could try to help with depression:  · Get regular exercise. It may help you feel better. · Plan something pleasant for yourself every day. Include activities that you have enjoyed in the past.  · Get enough sleep. Talk to your doctor if you have problems sleeping. · Eat a balanced diet. If you do not feel hungry, eat small snacks rather than large meals. · Avoid using illegal drugs or marijuana and drinking alcohol. Do not take medicines that have not been prescribed for you. They may interfere with your treatment, or they may make your depression worse. · Spend time with family and friends. It may help to speak openly about your depression with people you trust.  · Take your medicines exactly as prescribed. Call your doctor if you think you are having a problem with your medicine. · Do not make major life decisions while you are depressed. Depression may change the way you think. You will be able to make better decisions after you feel better. · Think positively. Challenge negative thoughts with statements such as \"I am hopeful\"; \"Things will get better\"; and \"I can ask for the help I need. \" Write down these statements and read them often, even if you don't believe them yet. · Be patient with yourself. It took time for your depression to develop, and it will take time for your symptoms to improve. Do not take on too much or be too hard on yourself. · Learn all you can about depression from written and online materials. · Check out behavioral health classes to learn more about dealing with depression. · If you or someone you know talks about suicide, self-harm, or feeling hopeless, get help right away.  Call the [facility] Suicide menopause symptoms treated? If your symptoms are bothering you, there are lifestyle changes and treatments that can help. Lifestyle changes    · Choose a heart-healthy diet that is low in saturated fat. It should include plenty of fruits, vegetables, beans, and high-fiber grains and breads. Be sure you get enough calcium and vitamin D to help your bones stay strong. Low-fat or nonfat dairy products are a great source of calcium.     · Get regular exercise. Exercise can help you manage your weight, keep your heart and bones strong, and lift your mood.     · Limit caffeine, alcohol, and stress. These things may make symptoms worse. Limiting them may help you sleep better.     · If you smoke, stop. Quitting smoking can reduce hot flashes and long-term health risks. Medicines  If your symptoms bother you, talk with your doctor. You may want to try prescription medicines, such as:    · Birth control pills before menopause.     · Hormone therapy (HT).   · Antidepressants.     · A medicine called clonidine that is usually used to treat high blood pressure. All medicines for menopause symptoms have possible risks or side effects. A very small number of women develop serious health problems when taking hormone therapy. Be sure to talk to your doctor about your possible health risks before you start a treatment for menopause symptoms. Other treatments  You can try:    · Cognitive-behavorial therapy. This may help reduce hot flashes.     · Hypnosis. This may help reduce the number and severity of hot flashes.     · Breathing exercises. They may help reduce hot flashes and emotional symptoms.     · Soy. Some women feel that eating lots of soy helps even out their menopause symptoms.     · Yoga or biofeedback. They may help reduce stress. Follow-up care is a key part of your treatment and safety. Be sure to make and go to all appointments, and call your doctor if you are having problems.  It's also a good idea to know your test results and keep a list of the medicines you take. Where can you learn more? Go to https://chpepiceweb.[employer]Laboratory Partners. org and sign in to your HESIODO account. Enter H199 in the [facility] box to learn more about \"Learning About Menopause. \"     If you do not have an account, please click on the \"Sign Up Now\" link. Current as of: November 8, 2019               Content Version: 12.6  © 4762-5690 [employer]. Care instructions adapted under license by [facility] ([facility]). If you have questions about a medical condition or this instruction, always ask your healthcare professional. [address] any warranty or liability for your use of this information.

## 2021-02-09 NOTE — LETTER
[address] [address]  Phone: [phone]  Fax: [phone]    Layla Glass MD        February 9, 2021     Patient: Brittany Mora   YOB: 1965   Date of Visit: 2/9/2021       To Whom It May Concern: It is my medical opinion that Fatoumata Lamb may return to work on 02/10/2021. Off work 02/05/2021 and 02/06/2021 for anxiety. 8 hour per shift work restriction til 03/15/2021. If you have any questions or concerns, please don't hesitate to call.     Sincerely,        Layla Glass MD

## 2021-02-10 ENCOUNTER — TELEPHONE (OUTPATIENT)
Dept: FAMILY MEDICINE CLINIC | Age: 56
End: 2021-02-10

## 2021-02-10 NOTE — LETTER
[address] [address]  Phone: [phone]  Fax: [phone]    Rolando Miranda MD        February 10, 2021     Patient: Ulysses Trujillo   YOB: 1965           To Whom It May Concern: It is my medical opinion that Shelbie Khanna may return to work on 02/13/2021. Off work 02/05/2021 through 02/12/2021 for anxiety. 8 hour shift restriction until 03/15/2021. If you have any questions or concerns, please don't hesitate to call.     Sincerely,        Rolando Miranda MD

## 2021-02-10 NOTE — TELEPHONE ENCOUNTER
Patient stopped in the office to  her work slip. I let her know that it wasn't completed yet.   She would like for it to RTW on 2/14/21 and faxed to [facility]

## 2021-02-10 NOTE — TELEPHONE ENCOUNTER
[facility] received a call from:    Name of Caller: Joaquin Golden    Relationship to patient: Self    Organization name: [employer] contact number: [phone]    Reason for call: Pt called stating Dr. Angeline Melvin gave her a slip for her days off work up until tonight 2/10. She thought she could go back tonight but she is asking if Dr. Angeline Melvin can fill out [place] paperwork for tonight 2/10 as well as 2/11 and 2/12. Please advise.

## 2021-02-19 ENCOUNTER — HOSPITAL ENCOUNTER (EMERGENCY)
Age: 56
Discharge: HOME | End: 2021-02-19
Payer: COMMERCIAL

## 2021-02-19 VITALS
RESPIRATION RATE: 17 BRPM | SYSTOLIC BLOOD PRESSURE: 107 MMHG | WEIGHT: 135 LBS | BODY MASS INDEX: 21.69 KG/M2 | TEMPERATURE: 98.1 F | HEIGHT: 66 IN | HEART RATE: 76 BPM | OXYGEN SATURATION: 96 % | DIASTOLIC BLOOD PRESSURE: 72 MMHG

## 2021-02-19 DIAGNOSIS — F41.1 ANXIETY STATE: Primary | ICD-10-CM

## 2021-02-19 DIAGNOSIS — F32.A DEPRESSION, UNSPECIFIED DEPRESSION TYPE: ICD-10-CM

## 2021-02-19 LAB
ACETAMINOPHEN LEVEL: < 5 UG/ML (ref 0–20)
ALBUMIN SERPL-MCNC: 4.3 G/DL (ref 3.5–5.1)
ALP BLD-CCNC: 47 U/L (ref 38–126)
ALT SERPL-CCNC: 28 U/L (ref 11–66)
AMPHETAMINE+METHAMPHETAMINE URINE SCREEN: NEGATIVE
ANION GAP SERPL CALCULATED.3IONS-SCNC: 14 MEQ/L (ref 8–16)
AST SERPL-CCNC: 24 U/L (ref 5–40)
BACTERIA: ABNORMAL /HPF
BARBITURATE QUANTITATIVE URINE: NEGATIVE
BASOPHILS # BLD: 0.9 %
BASOPHILS ABSOLUTE: 0.1 THOU/MM3 (ref 0–0.1)
BENZODIAZEPINE QUANTITATIVE URINE: NEGATIVE
BILIRUB SERPL-MCNC: 0.2 MG/DL (ref 0.3–1.2)
BILIRUBIN URINE: NEGATIVE
BLOOD, URINE: ABNORMAL
BUN BLDV-MCNC: 8 MG/DL (ref 7–22)
CALCIUM SERPL-MCNC: 9.6 MG/DL (ref 8.5–10.5)
CANNABINOID QUANTITATIVE URINE: NEGATIVE
CASTS 2: ABNORMAL /LPF
CASTS UA: ABNORMAL /LPF
CHARACTER, URINE: CLEAR
CHLORIDE BLD-SCNC: 108 MEQ/L (ref 98–111)
CO2: 22 MEQ/L (ref 23–33)
COCAINE METABOLITE QUANTITATIVE URINE: NEGATIVE
COLOR: YELLOW
CREAT SERPL-MCNC: 0.7 MG/DL (ref 0.4–1.2)
CRYSTALS, UA: ABNORMAL
EOSINOPHIL # BLD: 1.5 %
EOSINOPHILS ABSOLUTE: 0.1 THOU/MM3 (ref 0–0.4)
EPITHELIAL CELLS, UA: ABNORMAL /HPF
ERYTHROCYTE [DISTWIDTH] IN BLOOD BY AUTOMATED COUNT: 12.1 % (ref 11.5–14.5)
ERYTHROCYTE [DISTWIDTH] IN BLOOD BY AUTOMATED COUNT: 41.1 FL (ref 35–45)
ETHYL ALCOHOL, SERUM: 0.04 %
GFR SERPL CREATININE-BSD FRML MDRD: 86 ML/MIN/1.73M2
GLUCOSE BLD-MCNC: 101 MG/DL (ref 70–108)
GLUCOSE URINE: NEGATIVE MG/DL
HCT VFR BLD CALC: 46 % (ref 37–47)
HEMOGLOBIN: 15 GM/DL (ref 12–16)
IMMATURE GRANS (ABS): 0.03 THOU/MM3 (ref 0–0.07)
IMMATURE GRANULOCYTES: 0.3 %
KETONES, URINE: NEGATIVE
LEUKOCYTE ESTERASE, URINE: NEGATIVE
LIPASE: 40.4 U/L (ref 5.6–51.3)
LYMPHOCYTES # BLD: 26.7 %
LYMPHOCYTES ABSOLUTE: 2.4 THOU/MM3 (ref 1–4.8)
MCH RBC QN AUTO: 30.1 PG (ref 26–33)
MCHC RBC AUTO-ENTMCNC: 32.6 GM/DL (ref 32.2–35.5)
MCV RBC AUTO: 92.2 FL (ref 81–99)
MISCELLANEOUS 2: ABNORMAL
MONOCYTES # BLD: 5.9 %
MONOCYTES ABSOLUTE: 0.5 THOU/MM3 (ref 0.4–1.3)
NITRITE, URINE: NEGATIVE
NUCLEATED RED BLOOD CELLS: 0 /100 WBC
OPIATES, URINE: NEGATIVE
OSMOLALITY CALCULATION: 285.3 MOSMOL/KG (ref 275–300)
OXYCODONE: NEGATIVE
PH UA: 5 (ref 5–9)
PHENCYCLIDINE QUANTITATIVE URINE: NEGATIVE
PLATELET # BLD: 224 THOU/MM3 (ref 130–400)
PMV BLD AUTO: 9.8 FL (ref 9.4–12.4)
POTASSIUM SERPL-SCNC: 4 MEQ/L (ref 3.5–5.2)
PROTEIN UA: NEGATIVE
RBC # BLD: 4.99 MILL/MM3 (ref 4.2–5.4)
RBC URINE: ABNORMAL /HPF
RENAL EPITHELIAL, UA: ABNORMAL
SALICYLATE, SERUM: < 0.3 MG/DL (ref 2–10)
SEG NEUTROPHILS: 64.7 %
SEGMENTED NEUTROPHILS ABSOLUTE COUNT: 5.8 THOU/MM3 (ref 1.8–7.7)
SODIUM BLD-SCNC: 144 MEQ/L (ref 135–145)
SPECIFIC GRAVITY, URINE: 1.01 (ref 1–1.03)
TOTAL PROTEIN: 6.9 G/DL (ref 6.1–8)
TSH SERPL DL<=0.05 MIU/L-ACNC: 1.45 UIU/ML (ref 0.4–4.2)
UROBILINOGEN, URINE: 0.2 EU/DL (ref 0–1)
WBC # BLD: 8.9 THOU/MM3 (ref 4.8–10.8)
WBC UA: ABNORMAL /HPF
YEAST: ABNORMAL

## 2021-02-19 PROCEDURE — 81001 URINALYSIS AUTO W/SCOPE: CPT

## 2021-02-19 PROCEDURE — 82077 ASSAY SPEC XCP UR&BREATH IA: CPT

## 2021-02-19 PROCEDURE — 80179 DRUG ASSAY SALICYLATE: CPT

## 2021-02-19 PROCEDURE — 84443 ASSAY THYROID STIM HORMONE: CPT

## 2021-02-19 PROCEDURE — 80143 DRUG ASSAY ACETAMINOPHEN: CPT

## 2021-02-19 PROCEDURE — 80053 COMPREHEN METABOLIC PANEL: CPT

## 2021-02-19 PROCEDURE — 6370000000 HC RX 637 (ALT 250 FOR IP): Performed by: PHYSICIAN ASSISTANT

## 2021-02-19 PROCEDURE — 80307 DRUG TEST PRSMV CHEM ANLYZR: CPT

## 2021-02-19 PROCEDURE — 6370000000 HC RX 637 (ALT 250 FOR IP)

## 2021-02-19 PROCEDURE — 99284 EMERGENCY DEPT VISIT MOD MDM: CPT

## 2021-02-19 PROCEDURE — 85025 COMPLETE CBC W/AUTO DIFF WBC: CPT

## 2021-02-19 PROCEDURE — 36415 COLL VENOUS BLD VENIPUNCTURE: CPT

## 2021-02-19 PROCEDURE — 83690 ASSAY OF LIPASE: CPT

## 2021-02-19 RX ORDER — ACETAMINOPHEN 325 MG/1
650 TABLET ORAL ONCE
Status: COMPLETED | OUTPATIENT
Start: 2021-02-19 | End: 2021-02-19

## 2021-02-19 RX ORDER — ACETAMINOPHEN 325 MG/1
TABLET ORAL
Status: COMPLETED
Start: 2021-02-19 | End: 2021-02-19

## 2021-02-19 RX ORDER — HYDROXYZINE HYDROCHLORIDE 25 MG/1
50 TABLET, FILM COATED ORAL ONCE
Status: COMPLETED | OUTPATIENT
Start: 2021-02-19 | End: 2021-02-19

## 2021-02-19 RX ADMIN — ACETAMINOPHEN 650 MG: 325 TABLET ORAL at 18:59

## 2021-02-19 RX ADMIN — HYDROXYZINE HYDROCHLORIDE 25 MG: 25 TABLET, FILM COATED ORAL at 19:00

## 2021-02-19 ASSESSMENT — PATIENT HEALTH QUESTIONNAIRE - PHQ9: SUM OF ALL RESPONSES TO PHQ QUESTIONS 1-9: 7

## 2021-02-19 ASSESSMENT — SLEEP AND FATIGUE QUESTIONNAIRES
AVERAGE NUMBER OF SLEEP HOURS: 8
DO YOU USE A SLEEP AID: NO

## 2021-02-19 ASSESSMENT — PAIN SCALES - GENERAL: PAINLEVEL_OUTOF10: 7

## 2021-02-19 ASSESSMENT — PAIN DESCRIPTION - FREQUENCY: FREQUENCY: CONTINUOUS

## 2021-02-19 ASSESSMENT — PAIN DESCRIPTION - PAIN TYPE: TYPE: ACUTE PAIN

## 2021-02-19 NOTE — ED NOTES
Level B paged to [facility] AN AFFILIATE OF [facility] at this time.       Juan Duong [facility]  17/63/19 4049

## 2021-02-19 NOTE — PROGRESS NOTES
Provisional Diagnosis: Anxiety     Risk, Psychosocial and Contextual Factors:  Alcohol use, psych history     Current MH Treatment:  PCP     Present Suicidal Behavior:      Verbal:  Denied        Attempt: Denied     Access to Weapons:  Denied    Current Suicide Risk: Low, Moderate or High:    Low    Past Suicidal Behavior:       Verbal:Denied    Attempt:Denied    Self-Injurious/Self-Mutilation: Denied    Traumatic Event Within Past 2 Weeks:   Denied    Current Abuse: Denied     Legal: Denied    Violence: Denied    Protective Factors:  Peer support, Family support, Adequate housing    Housing: Patient lives with her         CPAP/Oxygen/Ambulation Difficulties: Denied     Basic Vital Signs Normal?: Check with Patients Nurse prior to Calling Psychiatry    Critical Labs?: Check with Patients Nurse prior to Calling Psychiatry    Clinical Summary:      Patient is a 27year old  female who presents to the ED voluntarily reporting  would like relief from panic and anxiety. Patient reports that she  fells anxious everyday. Patient felt panicky all day today. Patient states there was no end. Patient reports that she would like to speak to someone to take medication. Patient reports that she was prescribed 25mg of medication 3x a day. Patient states that she is not taking her medication because it might make her feel sleepy. Patient reports that she has never taken more than .5 a pill a day. Clinician provided brief education. Patient reports that she has an appointment in 3 weeks with Paty Maguire. Patient would like to be referred to [facility]. Patient reports that she feels like if she leaves she will still be anxious. Homicidal thoughts and/or plans denied. No delusions noted. Hallucinations denied. AOD denied. Level of Care Disposition:      1915 Patient assessed. Patient has no concerns. [place] Clinician consulted with provider. Patient provided resources. Patient referred to IOP.  Patient awaiting discharge.

## 2021-02-19 NOTE — ED NOTES
Presents to ED for depression. States this has been ongoing for years. Patient sees Dr. Nidia Christian and has been placed on medication but states them medications does not work so she self medicates with alcohol. Reports she drinks approximately 6- 12oz bottles of bud light daily. Rates pain in head 7/10. Shows no signs of distress at this time. Vital signs as noted.       Bernardino Monte RN  02/19/21 9868

## 2021-02-19 NOTE — ED NOTES
Pt resting in bed, requesting tylenol for a headache at this time. Informed provider.       Rojas Todd [place]  00/36/91 9392

## 2021-02-19 NOTE — ED NOTES
Pt moved to room 32 at this time. Pt states that she is just feeling hopeless. Denies any suicidal ideation, homicidal ideation. PT states that [facility] NAM[facility] ASTUDILL[facility] but could not see the provider for 3 weeks. Pt reports being non complaint with medicaiton at this time.       Sagar Snow, [place]  88/78/43 1413

## 2021-02-20 NOTE — ED PROVIDER NOTES
[facility]  eMERGENCY dEPARTMENT eNCOUnter          CHIEF COMPLAINT       Chief Complaint   Patient presents with    Depression       Nurses Notes reviewed and I agree except as noted inthe HPI. HISTORY OF PRESENT ILLNESS    Cristian Beverly is a 63 y.o. female who presents to the Emergency Department for the evaluation of depression. Patient states she has dealt with depression for several years, reporting onset of severe symptoms after finding her father  in 2017. She states she has followed with her PCP for this and has been prescribed medications several times to help but she only takes the medication for a day or 2 and does not take it long enough to see if it will work because she feels that nothing will get rid of her anxious thoughts. She states her symptoms have been gradually worsening and she has anxiety attacks all day every day. States she will experience intrusive, racing thoughts followed by negative thoughts that seem to be based on unfounded anxiety. These have caused an issue for her at work as she reports one of her sources of anxiety is in regards to seeing people without their close and she is a nurse. She states that she is always seeing people undressed and it has not bothered her until the past few years. She feels now that it is inappropriate for her to place catheters or to see children in diapers because this causes overwhelming anxiety and is causing issues with her ability to perform the duties of her job. States that she recently had an assessment at [facility] last week to get established as she is not currently following with anyone for any counseling or therapy services but her initial appointment is not for 3 weeks and she is concerned about the severity of her anxiety. She denies any suicidal or homicidal ideation or hallucinations. States she has been coping by drinking alcohol daily.   She has been doing this for years but it is worsened over the past year. She states she is drinking at least a sixpack of 12 ounce beers daily and has not tried to abstain and is unsure whether she would have withdrawal symptoms if she tried to go a day without drinking. She denies any associated drug use. Denies any recent illness or injury. The HPI was provided by the patient. REVIEW OF SYSTEMS     Review of Systems   Psychiatric/Behavioral: Negative for hallucinations, self-injury and suicidal ideas. The patient is nervous/anxious. All other systems reviewed and are negative. PAST MEDICAL HISTORY    has a past medical history of Cervical spinal stenosis and PVC (premature ventricular contraction). SURGICAL HISTORY      has a past surgical history that includes Cervical spine surgery (03/2016). CURRENT MEDICATIONS       Discharge Medication List as of 2/19/2021  9:04 PM      CONTINUE these medications which have NOT CHANGED    Details   estrogen, conjugated,-medroxyPROGESTERone (PREMPRO) 0.625-2.5 MG per tablet Take 1 tablet by mouth daily, Disp-30 tablet, R-2Normal      hydrOXYzine (ATARAX) 25 MG tablet Take 1 tablet by mouth 3 times daily as needed for Anxiety, Disp-30 tablet, R-2Normal      citalopram (CELEXA) 20 MG tablet Take 1 tablet by mouth daily, Disp-30 tablet, R-5Normal      pantoprazole (PROTONIX) 40 MG tablet Take 1 tablet by mouth every morning (before breakfast), Disp-30 tablet, R-1Normal      sucralfate (CARAFATE) 1 GM tablet Take 1 tablet by mouth 4 times daily, Disp-120 tablet, R-1Normal             ALLERGIES     has No Known Allergies. FAMILY HISTORY     She indicated that the status of her paternal grandmother is unknown. She indicated that the status of her neg hx is unknown.   family history includes Asthma in her paternal grandmother; Cancer in her paternal grandmother. SOCIAL HISTORY      reports that she has been smoking. She started smoking about 36 years ago. She has a 33.00 pack-year smoking history.  She care.  She will contact her PCP Monday to discuss further options as well. Return precautions were discussed with the patient prior to discharge and she denied further needs at this time. CRITICAL CARE:   None    CONSULTS:  MENA    PROCEDURES:  None    FINAL IMPRESSION      1. Anxiety state    2. Depression, unspecified depression type          DISPOSITION/PLAN   Discharge    PATIENT REFERRED TO:  Micheal Mccollum MD  [address] [facility] [address] [facility]  [phone]    Schedule an appointment as soon as possible for a visit on 2/19/2021      [address] [address] [address]  [address] [facility] [address] [phone]    Go to  in 3 weeks for appointment     Intensive Outpatient Program      Referral made by [facility] - they should contact you for further details    [facility] EMERGENCY DEPT  [address]    If symptoms worsen      DISCHARGEMEDICATIONS:  Discharge Medication List as of 2/19/2021  9:04 PM          (Please note that portions of this note were completedwith a voice recognition program.  Efforts were made to edit the dictations but occasionally words are mis-transcribed.)       Radha Salas PA-C  02/20/21 0400

## 2021-02-20 NOTE — ED NOTES
ED nurse-to-nurse bedside report    Chief Complaint   Patient presents with    Depression      LOC: alert and orientated to name, place, date  Vital signs   Vitals:    02/19/21 1722 02/19/21 1740   BP: 118/79 125/81   Pulse: 93 95   Resp: 18 20   Temp: 98.1 °F (36.7 °C)    TempSrc: Oral    SpO2: 96% 94%   Weight: 135 lb (61.2 kg)    Height: 5' 6\" (1.676 m)       Pain:    Pain Interventions: tylenol  Pain Goal: 4  Oxygen: No    Current needs required none   Telemetry: No  LDAs:    Continuous Infusions:   Mobility: Independent  Stewart Fall Risk Score: No flowsheet data found.   Fall Interventions: None  Report given to: NERI [employer], LPN  19/22/00 1125

## 2021-02-20 NOTE — ED NOTES
Pt resting on cot with easy respirations. Pt denies any needs at this time.       Suze NINO, NERI  02/19/21 2027

## 2021-03-04 ENCOUNTER — HOSPITAL ENCOUNTER (INPATIENT)
Age: 56
LOS: 3 days | Discharge: HOME | DRG: 897 | End: 2021-03-07
Attending: FAMILY MEDICINE | Admitting: FAMILY MEDICINE
Payer: COMMERCIAL

## 2021-03-04 DIAGNOSIS — F33.1 MODERATE EPISODE OF RECURRENT MAJOR DEPRESSIVE DISORDER (HCC): ICD-10-CM

## 2021-03-04 DIAGNOSIS — F41.9 ANXIETY: ICD-10-CM

## 2021-03-04 PROBLEM — F10.239 ALCOHOL DEPENDENCE WITH WITHDRAWAL (HCC): Status: ACTIVE | Noted: 2021-03-04

## 2021-03-04 LAB
ALBUMIN SERPL-MCNC: 4 G/DL (ref 3.5–5.1)
ALP BLD-CCNC: 45 U/L (ref 38–126)
ALT SERPL-CCNC: 30 U/L (ref 11–66)
ANION GAP SERPL CALCULATED.3IONS-SCNC: 9 MEQ/L (ref 8–16)
AST SERPL-CCNC: 30 U/L (ref 5–40)
BILIRUB SERPL-MCNC: 0.4 MG/DL (ref 0.3–1.2)
BUN BLDV-MCNC: 8 MG/DL (ref 7–22)
CALCIUM SERPL-MCNC: 9.3 MG/DL (ref 8.5–10.5)
CHLORIDE BLD-SCNC: 102 MEQ/L (ref 98–111)
CO2: 28 MEQ/L (ref 23–33)
CREAT SERPL-MCNC: 0.6 MG/DL (ref 0.4–1.2)
ERYTHROCYTE [DISTWIDTH] IN BLOOD BY AUTOMATED COUNT: 12.5 % (ref 11.5–14.5)
ERYTHROCYTE [DISTWIDTH] IN BLOOD BY AUTOMATED COUNT: 41.4 FL (ref 35–45)
ETHYL ALCOHOL, SERUM: < 0.01 %
GFR SERPL CREATININE-BSD FRML MDRD: > 90 ML/MIN/1.73M2
GLUCOSE BLD-MCNC: 105 MG/DL (ref 70–108)
HCT VFR BLD CALC: 44.1 % (ref 37–47)
HEMOGLOBIN: 14.8 GM/DL (ref 12–16)
MAGNESIUM: 2.2 MG/DL (ref 1.6–2.4)
MCH RBC QN AUTO: 30.4 PG (ref 26–33)
MCHC RBC AUTO-ENTMCNC: 33.6 GM/DL (ref 32.2–35.5)
MCV RBC AUTO: 90.6 FL (ref 81–99)
PHOSPHORUS: 4.8 MG/DL (ref 2.4–4.7)
PLATELET # BLD: 206 THOU/MM3 (ref 130–400)
PMV BLD AUTO: 9.7 FL (ref 9.4–12.4)
POTASSIUM SERPL-SCNC: 4.1 MEQ/L (ref 3.5–5.2)
RBC # BLD: 4.87 MILL/MM3 (ref 4.2–5.4)
SODIUM BLD-SCNC: 139 MEQ/L (ref 135–145)
TOTAL PROTEIN: 6.6 G/DL (ref 6.1–8)
WBC # BLD: 8.5 THOU/MM3 (ref 4.8–10.8)

## 2021-03-04 PROCEDURE — 84100 ASSAY OF PHOSPHORUS: CPT

## 2021-03-04 PROCEDURE — 1200000003 HC TELEMETRY R&B

## 2021-03-04 PROCEDURE — 82077 ASSAY SPEC XCP UR&BREATH IA: CPT

## 2021-03-04 PROCEDURE — 36415 COLL VENOUS BLD VENIPUNCTURE: CPT

## 2021-03-04 PROCEDURE — 2580000003 HC RX 258: Performed by: FAMILY MEDICINE

## 2021-03-04 PROCEDURE — 83735 ASSAY OF MAGNESIUM: CPT

## 2021-03-04 PROCEDURE — 80053 COMPREHEN METABOLIC PANEL: CPT

## 2021-03-04 PROCEDURE — 99222 1ST HOSP IP/OBS MODERATE 55: CPT | Performed by: FAMILY MEDICINE

## 2021-03-04 PROCEDURE — 6370000000 HC RX 637 (ALT 250 FOR IP): Performed by: FAMILY MEDICINE

## 2021-03-04 PROCEDURE — 85027 COMPLETE CBC AUTOMATED: CPT

## 2021-03-04 RX ORDER — PROMETHAZINE HYDROCHLORIDE 25 MG/1
12.5 TABLET ORAL EVERY 6 HOURS PRN
Status: DISCONTINUED | OUTPATIENT
Start: 2021-03-04 | End: 2021-03-04

## 2021-03-04 RX ORDER — SODIUM CHLORIDE 0.9 % (FLUSH) 0.9 %
10 SYRINGE (ML) INJECTION PRN
Status: DISCONTINUED | OUTPATIENT
Start: 2021-03-04 | End: 2021-03-07 | Stop reason: HOSPADM

## 2021-03-04 RX ORDER — ACETAMINOPHEN 650 MG/1
650 SUPPOSITORY RECTAL EVERY 6 HOURS PRN
Status: DISCONTINUED | OUTPATIENT
Start: 2021-03-04 | End: 2021-03-04

## 2021-03-04 RX ORDER — NICOTINE 21 MG/24HR
1 PATCH, TRANSDERMAL 24 HOURS TRANSDERMAL DAILY
Status: DISCONTINUED | OUTPATIENT
Start: 2021-03-05 | End: 2021-03-07 | Stop reason: HOSPADM

## 2021-03-04 RX ORDER — LORAZEPAM 2 MG/ML
4 INJECTION INTRAMUSCULAR
Status: DISCONTINUED | OUTPATIENT
Start: 2021-03-04 | End: 2021-03-07 | Stop reason: HOSPADM

## 2021-03-04 RX ORDER — POLYETHYLENE GLYCOL 3350 17 G/17G
17 POWDER, FOR SOLUTION ORAL DAILY PRN
Status: DISCONTINUED | OUTPATIENT
Start: 2021-03-04 | End: 2021-03-07 | Stop reason: HOSPADM

## 2021-03-04 RX ORDER — SODIUM CHLORIDE 0.9 % (FLUSH) 0.9 %
10 SYRINGE (ML) INJECTION EVERY 12 HOURS SCHEDULED
Status: DISCONTINUED | OUTPATIENT
Start: 2021-03-04 | End: 2021-03-04

## 2021-03-04 RX ORDER — ACETAMINOPHEN 325 MG/1
650 TABLET ORAL EVERY 6 HOURS PRN
Status: DISCONTINUED | OUTPATIENT
Start: 2021-03-04 | End: 2021-03-04

## 2021-03-04 RX ORDER — LANOLIN ALCOHOL/MO/W.PET/CERES
100 CREAM (GRAM) TOPICAL DAILY
Status: DISCONTINUED | OUTPATIENT
Start: 2021-03-04 | End: 2021-03-07 | Stop reason: HOSPADM

## 2021-03-04 RX ORDER — SODIUM CHLORIDE 9 MG/ML
INJECTION, SOLUTION INTRAVENOUS CONTINUOUS
Status: DISCONTINUED | OUTPATIENT
Start: 2021-03-04 | End: 2021-03-05

## 2021-03-04 RX ORDER — ACETAMINOPHEN 325 MG/1
650 TABLET ORAL EVERY 6 HOURS PRN
Status: DISCONTINUED | OUTPATIENT
Start: 2021-03-04 | End: 2021-03-07 | Stop reason: HOSPADM

## 2021-03-04 RX ORDER — ONDANSETRON 2 MG/ML
4 INJECTION INTRAMUSCULAR; INTRAVENOUS EVERY 6 HOURS PRN
Status: DISCONTINUED | OUTPATIENT
Start: 2021-03-04 | End: 2021-03-07 | Stop reason: HOSPADM

## 2021-03-04 RX ORDER — SUCRALFATE 1 G/1
1 TABLET ORAL 4 TIMES DAILY
Status: DISCONTINUED | OUTPATIENT
Start: 2021-03-04 | End: 2021-03-05 | Stop reason: ALTCHOICE

## 2021-03-04 RX ORDER — FOLIC ACID 1 MG/1
1 TABLET ORAL DAILY
Status: DISCONTINUED | OUTPATIENT
Start: 2021-03-05 | End: 2021-03-07 | Stop reason: HOSPADM

## 2021-03-04 RX ORDER — ACETAMINOPHEN 650 MG/1
650 SUPPOSITORY RECTAL EVERY 6 HOURS PRN
Status: DISCONTINUED | OUTPATIENT
Start: 2021-03-04 | End: 2021-03-04 | Stop reason: SDUPTHER

## 2021-03-04 RX ORDER — HYDROXYZINE HYDROCHLORIDE 25 MG/1
25 TABLET, FILM COATED ORAL 3 TIMES DAILY PRN
Status: DISCONTINUED | OUTPATIENT
Start: 2021-03-04 | End: 2021-03-06

## 2021-03-04 RX ORDER — PROMETHAZINE HYDROCHLORIDE 25 MG/1
12.5 TABLET ORAL EVERY 6 HOURS PRN
Status: DISCONTINUED | OUTPATIENT
Start: 2021-03-04 | End: 2021-03-07 | Stop reason: HOSPADM

## 2021-03-04 RX ORDER — LORAZEPAM 2 MG/1
2 TABLET ORAL
Status: DISCONTINUED | OUTPATIENT
Start: 2021-03-04 | End: 2021-03-07 | Stop reason: HOSPADM

## 2021-03-04 RX ORDER — LORAZEPAM 2 MG/ML
2 INJECTION INTRAMUSCULAR
Status: DISCONTINUED | OUTPATIENT
Start: 2021-03-04 | End: 2021-03-07 | Stop reason: HOSPADM

## 2021-03-04 RX ORDER — SODIUM CHLORIDE 0.9 % (FLUSH) 0.9 %
10 SYRINGE (ML) INJECTION EVERY 12 HOURS SCHEDULED
Status: DISCONTINUED | OUTPATIENT
Start: 2021-03-04 | End: 2021-03-04 | Stop reason: SDUPTHER

## 2021-03-04 RX ORDER — MULTIVITAMIN WITH IRON
1 TABLET ORAL DAILY
Status: DISCONTINUED | OUTPATIENT
Start: 2021-03-05 | End: 2021-03-07 | Stop reason: HOSPADM

## 2021-03-04 RX ORDER — SODIUM CHLORIDE 0.9 % (FLUSH) 0.9 %
10 SYRINGE (ML) INJECTION PRN
Status: DISCONTINUED | OUTPATIENT
Start: 2021-03-04 | End: 2021-03-04

## 2021-03-04 RX ORDER — CITALOPRAM 20 MG/1
20 TABLET ORAL DAILY
Status: DISCONTINUED | OUTPATIENT
Start: 2021-03-05 | End: 2021-03-07 | Stop reason: HOSPADM

## 2021-03-04 RX ORDER — ACETAMINOPHEN 325 MG/1
650 TABLET ORAL EVERY 6 HOURS PRN
Status: DISCONTINUED | OUTPATIENT
Start: 2021-03-04 | End: 2021-03-04 | Stop reason: SDUPTHER

## 2021-03-04 RX ORDER — LORAZEPAM 1 MG/1
1 TABLET ORAL
Status: DISCONTINUED | OUTPATIENT
Start: 2021-03-04 | End: 2021-03-07 | Stop reason: HOSPADM

## 2021-03-04 RX ORDER — LORAZEPAM 2 MG/ML
3 INJECTION INTRAMUSCULAR
Status: DISCONTINUED | OUTPATIENT
Start: 2021-03-04 | End: 2021-03-07 | Stop reason: HOSPADM

## 2021-03-04 RX ORDER — ACETAMINOPHEN 650 MG/1
650 SUPPOSITORY RECTAL EVERY 6 HOURS PRN
Status: DISCONTINUED | OUTPATIENT
Start: 2021-03-04 | End: 2021-03-07 | Stop reason: HOSPADM

## 2021-03-04 RX ORDER — LORAZEPAM 2 MG/ML
1 INJECTION INTRAMUSCULAR
Status: DISCONTINUED | OUTPATIENT
Start: 2021-03-04 | End: 2021-03-07 | Stop reason: HOSPADM

## 2021-03-04 RX ORDER — PANTOPRAZOLE SODIUM 40 MG/1
40 TABLET, DELAYED RELEASE ORAL
Status: DISCONTINUED | OUTPATIENT
Start: 2021-03-05 | End: 2021-03-05 | Stop reason: ALTCHOICE

## 2021-03-04 RX ORDER — ONDANSETRON 2 MG/ML
4 INJECTION INTRAMUSCULAR; INTRAVENOUS EVERY 6 HOURS PRN
Status: DISCONTINUED | OUTPATIENT
Start: 2021-03-04 | End: 2021-03-04

## 2021-03-04 RX ORDER — LORAZEPAM 2 MG/1
4 TABLET ORAL
Status: DISCONTINUED | OUTPATIENT
Start: 2021-03-04 | End: 2021-03-07 | Stop reason: HOSPADM

## 2021-03-04 RX ADMIN — SODIUM CHLORIDE: 9 INJECTION, SOLUTION INTRAVENOUS at 23:23

## 2021-03-04 RX ADMIN — HYDROXYZINE HYDROCHLORIDE 25 MG: 25 TABLET ORAL at 23:23

## 2021-03-04 RX ADMIN — Medication 100 MG: at 23:23

## 2021-03-04 ASSESSMENT — PAIN SCALES - GENERAL
PAINLEVEL_OUTOF10: 0
PAINLEVEL_OUTOF10: 0

## 2021-03-04 NOTE — LETTER
[facility]  [address] [facility] [address]  Phone: [phone]             March 7, 2021    Patient: Clover Annalee   YOB: 1965   Date of Visit: 3/4/2021       To Whom It May Concern:    Addy Pereyra was seen and treated in our facility beginning 3/4/2021 until 3/7/2021.       Sincerely,       Myron Oviedo RN         Signature:__________________________________

## 2021-03-05 PROCEDURE — 6370000000 HC RX 637 (ALT 250 FOR IP): Performed by: STUDENT IN AN ORGANIZED HEALTH CARE EDUCATION/TRAINING PROGRAM

## 2021-03-05 PROCEDURE — 1200000003 HC TELEMETRY R&B

## 2021-03-05 PROCEDURE — 99232 SBSQ HOSP IP/OBS MODERATE 35: CPT | Performed by: FAMILY MEDICINE

## 2021-03-05 PROCEDURE — 6370000000 HC RX 637 (ALT 250 FOR IP): Performed by: FAMILY MEDICINE

## 2021-03-05 PROCEDURE — 2580000003 HC RX 258: Performed by: FAMILY MEDICINE

## 2021-03-05 PROCEDURE — 94760 N-INVAS EAR/PLS OXIMETRY 1: CPT

## 2021-03-05 RX ORDER — PRAZOSIN HYDROCHLORIDE 1 MG/1
1 CAPSULE ORAL NIGHTLY
Status: DISCONTINUED | OUTPATIENT
Start: 2021-03-05 | End: 2021-03-06

## 2021-03-05 RX ORDER — PANTOPRAZOLE SODIUM 40 MG/1
40 TABLET, DELAYED RELEASE ORAL
Status: DISCONTINUED | OUTPATIENT
Start: 2021-03-06 | End: 2021-03-07 | Stop reason: HOSPADM

## 2021-03-05 RX ADMIN — FOLIC ACID 1 MG: 1 TABLET ORAL at 09:21

## 2021-03-05 RX ADMIN — Medication 100 MG: at 09:21

## 2021-03-05 RX ADMIN — LIDOCAINE HYDROCHLORIDE: 20 SOLUTION ORAL; TOPICAL at 12:05

## 2021-03-05 RX ADMIN — PRAZOSIN HYDROCHLORIDE 1 MG: 1 CAPSULE ORAL at 21:14

## 2021-03-05 RX ADMIN — LORAZEPAM 1 MG: 1 TABLET ORAL at 09:22

## 2021-03-05 RX ADMIN — CITALOPRAM 20 MG: 20 TABLET, FILM COATED ORAL at 10:13

## 2021-03-05 RX ADMIN — LORAZEPAM 1 MG: 1 TABLET ORAL at 22:49

## 2021-03-05 RX ADMIN — Medication 1 TABLET: at 09:21

## 2021-03-05 RX ADMIN — HYDROXYZINE HYDROCHLORIDE 25 MG: 25 TABLET ORAL at 17:39

## 2021-03-05 RX ADMIN — LORAZEPAM 1 MG: 1 TABLET ORAL at 00:54

## 2021-03-05 RX ADMIN — HYDROXYZINE HYDROCHLORIDE 25 MG: 25 TABLET ORAL at 09:22

## 2021-03-05 RX ADMIN — SODIUM CHLORIDE: 9 INJECTION, SOLUTION INTRAVENOUS at 09:23

## 2021-03-05 ASSESSMENT — PATIENT HEALTH QUESTIONNAIRE - PHQ9: SUM OF ALL RESPONSES TO PHQ QUESTIONS 1-9: 20

## 2021-03-05 NOTE — CONSULTS
Completed by ANABELLA [facility] AN AFFILIATE OF [facility] Clinician, listed under progress note.

## 2021-03-05 NOTE — PROGRESS NOTES
Brief Intervention and Referral to Treatment Summary    Patient was provided PHQ-9, AUDIT and DAST Screening:      PHQ-9 Score:  20  AUDIT Score:  25  DAST Score:  0    Patients substance use is considered     Low Risk/Healthy  Moderate Risk  Harmful  Dependent  X    Patients depression is considered:     Minimal  Mild   Moderate   X  Moderately Severe  Severe    Brief Education Was Provided    Patient was receptive        Brief Intervention Is Provided (Only for AUDIT or DAST)     Patient reports readiness to decrease and/or stop use and a plan was discussed       Recommendations/Referrals for Brief and/or Specialized Treatment Provided to Patient   Pt alert and oriented. Pt report drinking 8-10 (12 ounce) beers daily for years. Pt presented to [facility] to detox and was referred to [facility] due to medical concerns. Pt denies drug use, report moderate depression, denies SI/HI, denies A/V hallucinations ,no delusions noted. Pt initiated the process of being linked to [facility] for outpatient AOD/MH Treatment and plan to follow up . Pt also plan on contacting [facility] to see if she can present there upon discharge from [facility] to continue inpatient detox. Pt receptive to receiving resources from Clinician including OP MH/AOD Treatment, inpatient rehab and AA/NA meeting information.

## 2021-03-05 NOTE — PROGRESS NOTES
Pt admitted to  6K3 from direct admit from [facility]  Complaints: alcohol withdrawal.     Vital signs obtained. Assessment and data collection initiated. Two nurse skin assessment performed by [facility] and [address] [place] [address],[address] RN. Oriented to room. Policies and procedures for  explained. Deborah Lin RN discussed hourly rounding with patient addressing 5 P's. Fall prevention and safety brochure discussed with patient. Bed alarm on. Call light in reach. All questions answered with no further questions at this time.

## 2021-03-05 NOTE — PLAN OF CARE
Problem: Discharge Planning:  Goal: Discharged to appropriate level of care  Description: Discharged to appropriate level of care  Outcome: Ongoing  Note: Planning discharge home [facility]. Problem: Nutrition Deficit:  Goal: Ability to achieve adequate nutritional intake will improve  Description: Ability to achieve adequate nutritional intake will improve  Outcome: Ongoing  Note: Eating adequately without dietary complaints. 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 Problem: Violence - Risk of, Self/Other-Directed:  Goal: Knowledge of developmental care interventions  Description: Absence of violence  Outcome: Ongoing  Note: No thoughts of harming self. Problem: Falls - Risk of:  Goal: Will remain free from falls  Description: Will remain free from falls  Outcome: Ongoing  Note: No falls this shift. Stand by assist without difficulty. Care plan reviewed with patient and family. Patient and family verbalize understanding of the plan of care and contribute to goal setting.

## 2021-03-05 NOTE — CARE COORDINATION
3/5/21, 9:00 AM EST    DISCHARGE PLANNING EVALUATION    SW consult received \"for consideration for rehab\". Pt has been seen by Addiction Services, see BRET Lan progress note dated 3/5/21 for details. Pt was given resources. Pt is planning on contacting [facility] upon discharge. SW assessment deferred at this time.

## 2021-03-05 NOTE — PROGRESS NOTES
PROGRESS NOTE      Patient:  Tamir Dowd      Unit/Bed:6K-03/003-A    YOB: 1965    MRN: [id number]       Acct: [de-identified]     PCP: Rahul Luna MD    Date of Admission: 3/4/2021      Assessment/Plan:    Anticipated Discharge in : 2-3 days    Active Hospital Problems    Diagnosis Date Noted    Alcohol dependence with withdrawal ([facility] [F10.239] 03/04/2021       1. Alcohol dependence with withdrawal  -Last drink of 2 beers around 2-4PM on 3/4/2021  -Patient placed on CIWA protocol  -Continue multivitamin, thiamine, folate supplementation  -Addiction services consulted  -2 mg of Ativan over the past 24 hours  -Continue current plan    2. Major depressive disorder, current episode/ Anxiety  -Patient prescribed Celexa as outpatient however not been taking and has been self-medicating with alcohol.  -Celexa restarted on admission patient is interested in continuing this medication as outpatient.  -Patient is establishing with [facility] for outpatient therapy  -Possible component of PTSD as she may have panic attacks in the middle the night. We will start prazosin 1 mg nightly  -Continue Ativan based on CIWA protocol  -Consider possible BuSpar if having breakthrough anxiety at discharge. 3. Tobacco abuse  -33-pack-year history  -Continue nicotine patch    4. GERD  -GI cocktail for burning sensation in patient's epigastric region  -40 mg Protonix daily    5. Cervical Spinal stenosis  -Noted.   No acute symptoms    Chief Complaint: Alcohol detox    Hospital Course:    Per H&P    64 y/o F PMHx chronic alcohol and tobacco abuse, depression, anxiety and GERD -- presents to [facility] with a chief complaint of alcohol withdrawal. History provided by the patient.     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 She has never pursued rehab or attempted to detox in the past. She also denies previous history of alcohol withdrawal/withdrawal symptoms (including DTs/seizures). Drinks 6-12 bottles of beer daily / no hard liquor / last drink was 2 beers around 1600 this afternoon. Endorses chronic anxiety / gets some relief with prn atarax use. Also endorses chronic tobacco abuse / denies illicit drug use. No recent headache, tremors, chest pain, palpitations, cough or shortness of breath or other active symptoms. Patient's only request at this time is a cup of coffee. 3/5/21: Patient continues to complain of depression this morning. Continued Celexa and continue [facility] protocol for alcohol detox. Discharge after 72-hour window has passed. GI cocktail and Protonix added for possible GERD-like symptoms    Subjective: Patient sitting up in bed eating breakfast this morning. She admits to some anxiety and a burning sensation in her chest.  She states that she is feeling mildly anxious this morning and that the Ativan has helped her overnight. She complains of depression and admits that she has not been taking her Celexa and has been self-medicating with alcohol instead. Denies any other complaints at this time.       Medications:  Reviewed    Infusion Medications    sodium chloride 100 mL/hr at 03/05/21 2094     Scheduled Medications    GI cocktail   Oral Once    prazosin  1 mg Oral Nightly    [START ON 3/6/2021] pantoprazole  40 mg Oral QAM AC    citalopram  20 mg Oral Daily    nicotine  1 patch Transdermal Daily    thiamine  100 mg Oral Daily    folic acid  1 mg Oral Daily    multivitamin  1 tablet Oral Daily     PRN Meds: hydrOXYzine, sodium chloride flush, promethazine **OR** ondansetron, polyethylene glycol, LORazepam **OR** LORazepam **OR** LORazepam **OR** LORazepam **OR** LORazepam **OR** LORazepam **OR** LORazepam **OR** LORazepam, acetaminophen **OR** acetaminophen      Intake/Output Summary (Last 24 hours) at 3/5/2021 1146  Last data filed at 3/5/2021 1141  Gross per 24 hour   Intake 1601.76 ml   Output 100 ml   Net 1501.76 ml       Diet:  DIET GENERAL;    Exam:  /65   Pulse 91   Temp 98.1 °F (36.7 °C) (Oral)   Resp 18   Ht 5' 6\" (1.676 m)   Wt 130 lb 9.6 oz (59.2 kg)   SpO2 96%   BMI 21.08 kg/m²     General appearance: No apparent distress, appears stated age and cooperative. HEENT: Pupils equal, round, and reactive to light. Conjunctivae/corneas clear. Neck: Supple, with full range of motion. No jugular venous distention. Trachea midline. Respiratory:  Normal respiratory effort. Clear to auscultation, bilaterally without Rales/Wheezes/Rhonchi. Cardiovascular: Regular rate and rhythm with normal S1/S2 without murmurs, rubs or gallops. Abdomen: Soft, non-tender, non-distended with normal bowel sounds. Musculoskeletal: passive and active ROM x 4 extremities. Skin: Skin color, texture, turgor normal.  No rashes or lesions. Neurologic:  Neurovascularly intact without any focal sensory/motor deficits. Cranial nerves: II-XII intact, grossly non-focal.  Psychiatric: Alert and oriented, thought content appropriate, normal insight  Capillary Refill: Brisk,< 3 seconds   Peripheral Pulses: +2 palpable, equal bilaterally       Labs:   Recent Labs     03/04/21  2253   WBC 8.5   HGB 14.8   HCT 44.1        Recent Labs     03/04/21  2253      K 4.1      CO2 28   BUN 8   CREATININE 0.6   CALCIUM 9.3   PHOS 4.8*     Recent Labs     03/04/21  2253   AST 30   ALT 30   BILITOT 0.4   ALKPHOS 45     No results for input(s): INR in the last 72 hours. No results for input(s): [place] in the last 72 hours.     Urinalysis:      Lab Results   Component Value Date    NITRU NEGATIVE 02/19/2021    WBCUA 2-4 02/19/2021    BACTERIA NONE SEEN 02/19/2021 RBCUA 0-2 02/19/2021    BLOODU SMALL 02/19/2021    SPECGRAV 1.025 05/22/2015    GLUCOSEU NEGATIVE 02/19/2021       Radiology:  No orders to display       Diet: DIET GENERAL;    DVT prophylaxis: [] Lovenox                                 [x] SCDs                                 [] SQ Heparin                                 [] Encourage ambulation           [] Already on Anticoagulation     Disposition:    [x] Home       [] TCU       [] Rehab       [] Psych       [] SNF       [] [facility]       [] Other-    Code Status: Full Code      Electronically signed by Colin Orourke DO on 3/5/2021 at 11:46 AM

## 2021-03-05 NOTE — H&P
History & Physical       Patient: Jenaro De La Cruz  YOB: 1965    MRN: [id number]     Acct: [de-identified]    PCP: Kleber Palomino MD    Date of Admission: 3/4/2021    Date of Service: Patient seen / examined on 03/04/21 and admitted to inpatient with expected LOS greater than two midnights due to medical therapy. ASSESSMENT / PLAN:    Assistance with alcohol withdrawal / chronic hx alcohol abuse  Directed admitted for detox. Last drink 2 beers prior to arrival around 1600 this afternoon. No clinical signs/symptoms of withdrawal. No significant withdrawal history. Remains hemodynamically appropriate on RA. Direct admitted to med/tele. EtOH level and baseline labs pending. Alcohol scoring. CIWA protocol. IVF. Daily MTV, thiamine/folate. CM/SW and Addiction Services consulted for assistance. Chronic tobacco abuse  1 PPD x 33 years. Cessation counseling. Nicotine patch offered / patient would like to consider application. Chronic depression / anxiety  Celexa restarted, prn atarax resumed. GERD  PPI resumed. No longer on carafate. Chief Complaint: alcohol withdrawal    History of Present Illness:  64 y/o F PMHx chronic alcohol and tobacco abuse, depression, anxiety and GERD -- presents to [facility] with a chief complaint of alcohol withdrawal. History provided by the patient. 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 She has never pursued rehab or attempted to detox in the past. She also denies previous history of alcohol withdrawal/withdrawal symptoms (including DTs/seizures).  Drinks 6-12 bottles of beer daily / no hard liquor / last drink was 2 beers around Miscarriages / Stillbirths Neg Hx     Stroke Neg Hx     Substance Abuse Neg Hx     Vision Loss Neg Hx     Other Neg Hx      REVIEW OF SYSTEMS:  A 14-point ROS was obtained and negative, with the exception of pertinent positives as stated in the HPI. PHYSICAL EXAM:  Vitals:    03/04/21 1845 03/04/21 2035   BP: 119/82 121/70   Pulse: 118 93   Resp: 16 18   Temp: 98.2 °F (36.8 °C) 97.8 °F (36.6 °C)   TempSrc: Oral Oral   SpO2: 97% 94%     General appearance: Alert / well-appearing  female. Pleasant. Cooperative. NAD. HEENT: Normocephalic / atraumatic. Conjunctivae appear normal.  Neck: Supple. No JVD. Respiratory: Unlabored on RA. CTAB. No wheezes / rales / rhonchi. Cardiovascular: RRR. Normal S1/S2. No murmurs / rubs / gallops. Abdomen: Soft / non-tender / non-distended. Musculoskeletal: No cyanosis or edema. Skin: Warm / dry. Normal turgor. No pallor / diaphoresis. Neurologic: A/O x 3. Speech normal. Answers questions appropriately. No involuntary movements / obvious focal neurologic deficits. Psychiatric: Thought content / judgment / insight appear appropriate. Peripheral pulses: Normal bilaterally. Labs / EKG / Radiology: none available    CODE STATUS: FULL    Thank you Layla Glass MD for the opportunity to be involved in this patient's care.     Electronically signed by Jordon Sexton MD on 3/4/2021

## 2021-03-06 LAB
ANION GAP SERPL CALCULATED.3IONS-SCNC: 9 MEQ/L (ref 8–16)
BASOPHILS # BLD: 0.9 %
BASOPHILS ABSOLUTE: 0.1 THOU/MM3 (ref 0–0.1)
BUN BLDV-MCNC: 12 MG/DL (ref 7–22)
CALCIUM SERPL-MCNC: 8.6 MG/DL (ref 8.5–10.5)
CHLORIDE BLD-SCNC: 106 MEQ/L (ref 98–111)
CO2: 25 MEQ/L (ref 23–33)
CREAT SERPL-MCNC: 0.7 MG/DL (ref 0.4–1.2)
EOSINOPHIL # BLD: 3 %
EOSINOPHILS ABSOLUTE: 0.2 THOU/MM3 (ref 0–0.4)
ERYTHROCYTE [DISTWIDTH] IN BLOOD BY AUTOMATED COUNT: 12.6 % (ref 11.5–14.5)
ERYTHROCYTE [DISTWIDTH] IN BLOOD BY AUTOMATED COUNT: 43.6 FL (ref 35–45)
GFR SERPL CREATININE-BSD FRML MDRD: 86 ML/MIN/1.73M2
GLUCOSE BLD-MCNC: 99 MG/DL (ref 70–108)
HCT VFR BLD CALC: 39.8 % (ref 37–47)
HEMOGLOBIN: 12.9 GM/DL (ref 12–16)
IMMATURE GRANS (ABS): 0.01 THOU/MM3 (ref 0–0.07)
IMMATURE GRANULOCYTES: 0.2 %
LYMPHOCYTES # BLD: 41.1 %
LYMPHOCYTES ABSOLUTE: 2.3 THOU/MM3 (ref 1–4.8)
MCH RBC QN AUTO: 30.4 PG (ref 26–33)
MCHC RBC AUTO-ENTMCNC: 32.4 GM/DL (ref 32.2–35.5)
MCV RBC AUTO: 93.6 FL (ref 81–99)
MONOCYTES # BLD: 10.4 %
MONOCYTES ABSOLUTE: 0.6 THOU/MM3 (ref 0.4–1.3)
NUCLEATED RED BLOOD CELLS: 0 /100 WBC
PLATELET # BLD: 154 THOU/MM3 (ref 130–400)
PMV BLD AUTO: 9.8 FL (ref 9.4–12.4)
POTASSIUM SERPL-SCNC: 3.8 MEQ/L (ref 3.5–5.2)
RBC # BLD: 4.25 MILL/MM3 (ref 4.2–5.4)
SEG NEUTROPHILS: 44.4 %
SEGMENTED NEUTROPHILS ABSOLUTE COUNT: 2.5 THOU/MM3 (ref 1.8–7.7)
SODIUM BLD-SCNC: 140 MEQ/L (ref 135–145)
WBC # BLD: 5.6 THOU/MM3 (ref 4.8–10.8)

## 2021-03-06 PROCEDURE — 85025 COMPLETE CBC W/AUTO DIFF WBC: CPT

## 2021-03-06 PROCEDURE — 80048 BASIC METABOLIC PNL TOTAL CA: CPT

## 2021-03-06 PROCEDURE — 6370000000 HC RX 637 (ALT 250 FOR IP): Performed by: FAMILY MEDICINE

## 2021-03-06 PROCEDURE — 6370000000 HC RX 637 (ALT 250 FOR IP): Performed by: STUDENT IN AN ORGANIZED HEALTH CARE EDUCATION/TRAINING PROGRAM

## 2021-03-06 PROCEDURE — 99232 SBSQ HOSP IP/OBS MODERATE 35: CPT | Performed by: FAMILY MEDICINE

## 2021-03-06 PROCEDURE — 1200000003 HC TELEMETRY R&B

## 2021-03-06 PROCEDURE — 36415 COLL VENOUS BLD VENIPUNCTURE: CPT

## 2021-03-06 PROCEDURE — 6370000000 HC RX 637 (ALT 250 FOR IP): Performed by: PSYCHIATRY & NEUROLOGY

## 2021-03-06 RX ORDER — TRAZODONE HYDROCHLORIDE 50 MG/1
50 TABLET ORAL NIGHTLY PRN
Status: DISCONTINUED | OUTPATIENT
Start: 2021-03-06 | End: 2021-03-07 | Stop reason: HOSPADM

## 2021-03-06 RX ORDER — HYDROXYZINE HYDROCHLORIDE 25 MG/1
50 TABLET, FILM COATED ORAL 3 TIMES DAILY PRN
Status: DISCONTINUED | OUTPATIENT
Start: 2021-03-06 | End: 2021-03-07 | Stop reason: HOSPADM

## 2021-03-06 RX ADMIN — LORAZEPAM 1 MG: 1 TABLET ORAL at 23:01

## 2021-03-06 RX ADMIN — TRAZODONE HYDROCHLORIDE 50 MG: 50 TABLET ORAL at 23:01

## 2021-03-06 RX ADMIN — LORAZEPAM 1 MG: 1 TABLET ORAL at 08:42

## 2021-03-06 RX ADMIN — FOLIC ACID 1 MG: 1 TABLET ORAL at 08:42

## 2021-03-06 RX ADMIN — HYDROXYZINE HYDROCHLORIDE 50 MG: 25 TABLET ORAL at 16:49

## 2021-03-06 RX ADMIN — CITALOPRAM 20 MG: 20 TABLET, FILM COATED ORAL at 08:42

## 2021-03-06 RX ADMIN — LORAZEPAM 1 MG: 1 TABLET ORAL at 16:49

## 2021-03-06 RX ADMIN — PANTOPRAZOLE SODIUM 40 MG: 40 TABLET, DELAYED RELEASE ORAL at 06:16

## 2021-03-06 RX ADMIN — Medication 1 TABLET: at 08:42

## 2021-03-06 RX ADMIN — HYDROXYZINE HYDROCHLORIDE 25 MG: 25 TABLET ORAL at 08:42

## 2021-03-06 RX ADMIN — Medication 100 MG: at 08:42

## 2021-03-06 NOTE — PROGRESS NOTES
longtime struggle with chronic alcohol abuse / states her father was an alcoholic, and that she spent many years caring for him / reportedly came home and \"found him on the floor\" (additional details not provided) / subsequently decided to quit drinking alcohol / currently feels she has both the time and opportunity to focus on her own addiction. She has never pursued rehab or attempted to detox in the past. She also denies previous history of alcohol withdrawal/withdrawal symptoms (including DTs/seizures). Drinks 6-12 bottles of beer daily / no hard liquor / last drink was 2 beers around 1600 this afternoon. Endorses chronic anxiety / gets some relief with prn atarax use. Also endorses chronic tobacco abuse / denies illicit drug use. No recent headache, tremors, chest pain, palpitations, cough or shortness of breath or other active symptoms. Patient's only request at this time is a cup of coffee. 3/5/21: Patient continues to complain of depression this morning. Continued Celexa and continue [facility] protocol for alcohol detox. Discharge after 72-hour window has passed. GI cocktail and Protonix added for possible GERD-like symptoms    3/6/21: Patient slept very well overnight with the prazosin however she states this morning she is very anxious and she states that she would like to discuss her mood with a psychiatrist.  Consult placed    Subjective: Patient resting in bed this morning. States that she slept very well overnight and that the prazosin worked fairly well. Patient woke up this morning very anxious and depressed. She states that she would like to speak with a psychiatrist to further discuss her mood and anxiety. Patient admits to some palpitations and shakiness this morning. Patient received a dose of Ativan overnight.     Medications:  Reviewed    Infusion Medications     Scheduled Medications    prazosin  1 mg Oral Nightly    pantoprazole  40 mg Oral QAM AC    citalopram  20 mg Oral Daily  nicotine  1 patch Transdermal Daily    thiamine  100 mg Oral Daily    folic acid  1 mg Oral Daily    multivitamin  1 tablet Oral Daily     PRN Meds: hydrOXYzine, sodium chloride flush, promethazine **OR** ondansetron, polyethylene glycol, LORazepam **OR** LORazepam **OR** LORazepam **OR** LORazepam **OR** LORazepam **OR** LORazepam **OR** LORazepam **OR** LORazepam, acetaminophen **OR** acetaminophen      Intake/Output Summary (Last 24 hours) at 3/6/2021 1017  Last data filed at 3/6/2021 0323  Gross per 24 hour   Intake 3111.2 ml   Output 0 ml   Net 3111.2 ml       Diet:  DIET GENERAL;    Exam:  /62   Pulse 93   Temp 97.6 °F (36.4 °C) (Oral)   Resp 16   Ht 5' 6\" (1.676 m)   Wt 135 lb 5.8 oz (61.4 kg)   SpO2 96%   BMI 21.85 kg/m²     General appearance: No apparent distress, appears stated age and cooperative. HEENT: Pupils equal, round, and reactive to light. Conjunctivae/corneas clear. Neck: Supple, with full range of motion. No jugular venous distention. Trachea midline. Respiratory:  Normal respiratory effort. Clear to auscultation, bilaterally without Rales/Wheezes/Rhonchi. Cardiovascular: Regular rate and rhythm with normal S1/S2 without murmurs, rubs or gallops. Abdomen: Soft, non-tender, non-distended with normal bowel sounds. Musculoskeletal: passive and active ROM x 4 extremities. Skin: Skin color, texture, turgor normal.  No rashes or lesions. Neurologic:  Neurovascularly intact without any focal sensory/motor deficits. Cranial nerves: II-XII intact, grossly non-focal.  Psychiatric: Alert and oriented, thought content appropriate, normal insight. Depressed/anxious mood.    Capillary Refill: Brisk,< 3 seconds   Peripheral Pulses: +2 palpable, equal bilaterally       Labs:   Recent Labs     03/04/21 2253 03/06/21  0519   WBC 8.5 5.6   HGB 14.8 12.9   HCT 44.1 39.8    154     Recent Labs     03/04/21 2253 03/06/21  0519    140   K 4.1 3.8    106   CO2 28 25 BUN 8 12   CREATININE 0.6 0.7   CALCIUM 9.3 8.6   PHOS 4.8*  --      Recent Labs     03/04/21  2253   AST 30   ALT 30   BILITOT 0.4   ALKPHOS 45     No results for input(s): INR in the last 72 hours. No results for input(s): [facility] Old[facility] in the last 72 hours.     Urinalysis:      Lab Results   Component Value Date    NITRU NEGATIVE 02/19/2021    WBCUA 2-4 02/19/2021    BACTERIA NONE SEEN 02/19/2021    RBCUA 0-2 02/19/2021    BLOODU SMALL 02/19/2021    SPECGRAV 1.025 05/22/2015    GLUCOSEU NEGATIVE 02/19/2021       Radiology:  No orders to display       Diet: DIET GENERAL;    DVT prophylaxis: [] Lovenox                                 [x] SCDs                                 [] SQ Heparin                                 [] Encourage ambulation           [] Already on Anticoagulation     Disposition:    [x] Home       [] TCU       [] Rehab       [] Psych       [] SNF       [] [facility]       [] Other-    Code Status: Full Code      Electronically signed by Leonela Silva DO on 3/6/2021 at 10:17 AM

## 2021-03-06 NOTE — CONSULTS
Patient interviewed  Meds and chart reviewed  Orders written  Yes  Full consult will be dictated  Thanks for the consult.

## 2021-03-06 NOTE — PLAN OF CARE
Problem: Sleep Pattern Disturbance:  Goal: Appears well-rested  Description: Appears well-rested  3/6/2021 0043 by Noris Unger RN  Outcome: Ongoing  Note: Lights off, door closed to reduce noise, speak in soft tones to promote sleep. 3/5/2021 2359 by Noris Unger RN  Outcome: Ongoing  Note: Lights off, noise level reduced, Door closed to promote restful sleep. 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 Problem: Violence - Risk of, Self/Other-Directed:  Goal: Knowledge of developmental care interventions  Description: Absence of violence  3/6/2021 0043 by Noris Unger RN  Outcome: Ongoing  Note: Patient recognizes signs of agitation and verbally listed the ques  3/5/2021 2359 by Noris Unger RN  Outcome: Ongoing  3/5/2021 1426 by Honey Dietrich RN  Outcome: Ongoing  Note: No thoughts of harming self. Care plan reviewed with patient. Patient verbalizes understanding of plan of care and contributes to goal setting.

## 2021-03-06 NOTE — PLAN OF CARE
Problem: Discharge Planning:  Goal: Discharged to appropriate level of care  Description: Discharged to appropriate level of care  3/6/2021 0958 by Honey Dietrich RN  Outcome: Ongoing  Note: Planning discharge home with family with follow up care at [facility]. Problem: Fluid Volume - Deficit:  Goal: Absence of fluid volume deficit signs and symptoms  Description: Absence of fluid volume deficit signs and symptoms  3/6/2021 0958 by Honey Dietrich RN  Outcome: Ongoing  Note: Lung sounds clear, no edema in lower extremities. Problem: Nutrition Deficit:  Goal: Ability to achieve adequate nutritional intake will improve  Description: Ability to achieve adequate nutritional intake will improve  3/6/2021 0958 by Honey Dietrich RN  Outcome: Ongoing  Note: Eating adequately, no complaints at this time. Problem: Sleep Pattern Disturbance:  Goal: Appears well-rested  Description: Appears well-rested  3/6/2021 0958 by Honey Dietrich RN  Outcome: Ongoing  Note: Patient states she was able to get a good nights sleep last night after starting [employer]. Problem: Falls - Risk of:  Goal: Will remain free from falls  Description: Will remain free from falls  3/6/2021 0958 by Honey Dietrich RN  Outcome: Ongoing  Note: No falls this shift. Stand by assist. Oriented to own abilities. Bed alarm on, call light within reach. Care plan reviewed with patient and family. Patient and family verbalize understanding of the plan of care and contribute to goal setting.

## 2021-03-07 VITALS
RESPIRATION RATE: 18 BRPM | TEMPERATURE: 98.2 F | OXYGEN SATURATION: 95 % | DIASTOLIC BLOOD PRESSURE: 74 MMHG | WEIGHT: 132.28 LBS | SYSTOLIC BLOOD PRESSURE: 110 MMHG | HEIGHT: 66 IN | BODY MASS INDEX: 21.26 KG/M2 | HEART RATE: 93 BPM

## 2021-03-07 PROCEDURE — 6370000000 HC RX 637 (ALT 250 FOR IP): Performed by: PSYCHIATRY & NEUROLOGY

## 2021-03-07 PROCEDURE — 6370000000 HC RX 637 (ALT 250 FOR IP): Performed by: STUDENT IN AN ORGANIZED HEALTH CARE EDUCATION/TRAINING PROGRAM

## 2021-03-07 PROCEDURE — 6370000000 HC RX 637 (ALT 250 FOR IP): Performed by: FAMILY MEDICINE

## 2021-03-07 PROCEDURE — 99239 HOSP IP/OBS DSCHRG MGMT >30: CPT | Performed by: FAMILY MEDICINE

## 2021-03-07 RX ORDER — HYDROXYZINE 50 MG/1
50 TABLET, FILM COATED ORAL 3 TIMES DAILY PRN
Qty: 60 TABLET | Refills: 0 | Status: SHIPPED | OUTPATIENT
Start: 2021-03-07 | End: 2021-03-18 | Stop reason: SDUPTHER

## 2021-03-07 RX ORDER — PANTOPRAZOLE SODIUM 40 MG/1
40 TABLET, DELAYED RELEASE ORAL
Qty: 30 TABLET | Refills: 0 | Status: SHIPPED | OUTPATIENT
Start: 2021-03-07 | End: 2021-03-18 | Stop reason: ALTCHOICE

## 2021-03-07 RX ORDER — TRAZODONE HYDROCHLORIDE 50 MG/1
50 TABLET ORAL NIGHTLY PRN
Qty: 30 TABLET | Refills: 0 | Status: SHIPPED | OUTPATIENT
Start: 2021-03-07 | End: 2021-03-18 | Stop reason: SDUPTHER

## 2021-03-07 RX ORDER — CITALOPRAM 20 MG/1
20 TABLET ORAL DAILY
Qty: 30 TABLET | Refills: 0 | Status: SHIPPED | OUTPATIENT
Start: 2021-03-07 | End: 2021-03-18 | Stop reason: SDUPTHER

## 2021-03-07 RX ADMIN — HYDROXYZINE HYDROCHLORIDE 50 MG: 25 TABLET ORAL at 08:18

## 2021-03-07 RX ADMIN — Medication 100 MG: at 08:11

## 2021-03-07 RX ADMIN — Medication 1 TABLET: at 08:11

## 2021-03-07 RX ADMIN — PANTOPRAZOLE SODIUM 40 MG: 40 TABLET, DELAYED RELEASE ORAL at 08:11

## 2021-03-07 RX ADMIN — CITALOPRAM 20 MG: 20 TABLET, FILM COATED ORAL at 08:11

## 2021-03-07 RX ADMIN — FOLIC ACID 1 MG: 1 TABLET ORAL at 08:11

## 2021-03-07 ASSESSMENT — PAIN SCALES - GENERAL: PAINLEVEL_OUTOF10: 0

## 2021-03-07 NOTE — CONSULTS
[address] [id number]                                  CONSULTATION    PATIENT NAME: Delbert Villagran                 :        1965  MED REC NO:   [id number]                           ROOM:       0003  ACCOUNT NO:   [de-identified]                           ADMIT DATE: 2021  PROVIDER:     SHIV Moreno Standin2021    CONSULT TO:  Dr. Dharmesh Motta:  Severe depression, possible component of PTSD,  started on Celexa and prazosin, due for alcohol detox, would like to  speak with psychiatrist.    SOURCES OF INFORMATION:  The patient and electronic medical records. IDENTIFYING INFORMATION:  The patient is a 67-year-old    female. She has no children. She lives with her . 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 HISTORY OF PRESENT ILLNESS:  The patient presents to [address] [place] [address] for alcohol withdrawal.  She has a history of alcohol use on and  off since her father  in . She was caring for her father who  had a history of alcoholism. In fact, she found her father dead. She  said since then, she has been feeling depressed and anxious. She also  has been drinking since then, but for the past year, she has been  drinking more between 8 to 12 beers a day. She feels guilty about her  drinking and she is feeling need to quit drinking. She went to [facility]  and she was instructed to come to the emergency room. She feels hopeless. She has trouble staying asleep. She has poor  energy, poor appetite. She does not enjoy things anymore and she also  has poor concentration. She denies suicidal thoughts. She denies  hypomanic or manic symptoms. No psychotic symptoms. She feels anxious  all the time and she worries a lot.   She also reports anxiety attacks  associated with feeling shaky, chest pressure, increased heart rate, between 8 to 12 beers  daily as stated earlier. She never drank heavy until her father . She denies illicit drugs including cannabis. In her 25s, she  experienced cannabis. She smokes one pack of cigarettes every day. No  legal trouble. No DUIs. She is a member of [employer] outside  of [place]. MEDICAL AND SURGICAL HISTORY:  History of cervical stenosis status post  cervical spine surgery. MEDICATIONS:  Citalopram 20 mg daily, folic acid, multivitamins,  nicotine patch, Protonix, thiamine, prazosin 1 mg at bedtime. PRN  medications include Tylenol, lorazepam per [facility] protocol, hydroxyzine 25  mg three times a day as needed. ALLERGIES:  No known drug allergies. MENTAL STATUS EXAMINATION:  The patient appears stated age, dressed in a  hospital gown. She has good eye contact. Good grooming and hygiene. She is cooperative with the interview. Speech clear, coherent, and  spontaneous. Mood sad and anxious and affect restricted. She denies  suicidal or homicidal ideation. No psychosis. No mood swings, no  flight of ideas, and no racing thoughts. Thought process is goal  oriented. She is alert and oriented x3. She has fair attention and  concentration. Memory appears to be intact as tested within the context  of the interview. Intelligence appears average. Judgment and insight  poor. DIAGNOSES:  1.  Major depressive disorder, recurrent, moderate. 2.  Generalized anxiety disorder. 3.  Panic disorder without agoraphobia. 4.  Alcohol use disorder, moderate. 5.  Rule out alcohol-induced mood and anxiety disorder. 6.  Rule out posttraumatic stress disorder. 7.  Chronic alcohol use, pathological grief. ASSESSMENT:  The patient is a 58-year-old   female. She  presented to [facility] ED for alcohol withdrawal.  She  has a history of drinking between 8 to 12 beers daily for the past year.   She started drinking on a consistent basis after she found her father  dead in 2017. She had a history of anxiety in her adult life, but since  2017 she has been having significant anxiety symptoms as well as  depressive symptoms. She was tried briefly on paroxetine many years  ago. About two weeks ago, she was prescribed citalopram, but she was  not taking it on a consistent basis. Besides major depressive disorder,  generalized anxiety disorder, and panic disorder without agoraphobia, we  need to rule out possibility of posttraumatic stress disorder. She  reports that she has flashbacks about her father, but she denies  nightmares. Prazosin was started a few nights ago, which is a good  idea, but unfortunately the patient is not having nightmares. Prazosin  may not be helpful since she is not having any nightmares. But she  reports having trouble staying asleep. Again, she admits for the past  year, she has been drinking heavier between 8 to 12 beers daily. She  denies DUIs. PLAN:  1. Continue medical management per Dr. Yasmine Kumari and his associates. 2.  Agreeable with [facility] protocol. 3.  Discontinue prazosin since the patient is not having nightmares. 4.  Continue with citalopram, which can be increased most likely in the  outpatient setting. 5.  Increase hydroxyzine to help with anxiety symptoms, add trazodone. 6.  Risks and benefits of psychotropics discussed as well as alternative  treatment. 7.  Support and reassurance given. 8.  Strongly encouraged the patient to follow up outpatient at [facility]. Thank you Dr. Yasmine Kumari for allowing me to participate in the care of  this patient.         Shawna Bundy M.D.    D: 03/06/2021 19:28:08       T: 03/06/2021 22:00:17     [email or web address]  Job#: [id number]     Doc#: [id number]    CC:  Dr. Yasmine Kumari

## 2021-03-07 NOTE — DISCHARGE SUMMARY
Hospital Medicine Discharge Summary      Patient Identification:   Edwin Arnold   : 1965  MRN: [id number]   Account: [de-identified]      Patient's PCP: Roxanne Lim MD    Admit Date: 3/4/2021     Discharge Date:   3/7/2021    Admitting Physician: No admitting provider for patient encounter. Discharge Physician: Jignesh Adkins MD     Discharge Diagnoses: Active Hospital Problems    Diagnosis Date Noted    Alcohol dependence with withdrawal [facility]) [F10.239] 2021       The patient was seen and examined on day of discharge and this discharge summary is in conjunction with any daily progress note from day of discharge. Hospital Course:   Edwin Arnold is a 63 y.o. female admitted to [facility] on 3/4/2021 for Alcohol withdrawal/detoxification. She was treated with the CIWA/Ativan protocol. Patient also has underlying depression and anxiety which is undertreated due to medication noncompliance. Psychiatry did evaluate the patient and recommended celexa qd, hydroxyzine prn and trazodine qhs prn. These prescriptions were sent to her pharmacy. She is going to [facility] from our facility for outpt services. Her withdrawal sxms were mild, mostly consistent of mild hypertension, anxiety which required very small amounts of ativan. On day of dc, pt is stable, no withdrawal. She is >72hrs from her last drink. 1. Alcohol dependence with withdrawal  -Last drink of 2 beers around 2-4PM on 3/4/2021  -Patient placed on CIWA protocol  -Continue multivitamin, thiamine, folate supplementation  -Addiction services consulted  -2 mg of Ativan over the past 24 hours     2.  Major depressive disorder, current episode/ Anxiety  -Patient prescribed Celexa as outpatient however not been taking and has been self-medicating with alcohol.  -Celexa restarted on admission patient is interested in continuing this medication as outpatient.  -Patient is establishing with [facility] for outpatient therapy. -psych consulted: appreciate med recs as above  - PTSD needs to be ruled out. Prazosin trial but discontinued as pt is not having nightmares.      3. Tobacco abuse  -33-pack-year history  -Continue nicotine patch  - counselling     4. GERD  -GI cocktail for burning sensation in patient's epigastric region  -40 mg Protonix daily - rx sent      5. Cervical Spinal stenosis  -Noted. No acute symptoms       Exam:     Vitals:  Vitals:    03/06/21 1630 03/06/21 2249 03/07/21 0340 03/07/21 0800   BP: 120/76 122/77 92/61 110/74   Pulse: 90 70 71 93   Resp: 18 18 18 18   Temp: 98.3 °F (36.8 °C) 98.4 °F (36.9 °C) 98.3 °F (36.8 °C) 98.2 °F (36.8 °C)   TempSrc: Oral Oral Oral Oral   SpO2: 96% 97% 96% 95%   Weight:   132 lb 4.4 oz (60 kg)    Height:         Weight: Weight: 132 lb 4.4 oz (60 kg)     24 hour intake/output:    Intake/Output Summary (Last 24 hours) at 3/7/2021 0818  Last data filed at 3/7/2021 0340  Gross per 24 hour   Intake 1230 ml   Output 0 ml   Net 1230 ml         General appearance:  No apparent distress, appears stated age and cooperative. HEENT:  Normal cephalic, atraumatic without obvious deformity. Pupils equal, round, and reactive to light. Extra ocular muscles intact. Conjunctivae/corneas clear. Neck: Supple, with full range of motion. No jugular venous distention. Trachea midline. Respiratory:  Normal respiratory effort. Clear to auscultation, bilaterally without Rales/Wheezes/Rhonchi. Cardiovascular:  Regular rate and rhythm with normal S1/S2 without murmurs, rubs or gallops. Abdomen: Soft, non-tender, non-distended with normal bowel sounds. Musculoskeletal:  No clubbing, cyanosis or edema bilaterally. Full range of motion without deformity. Skin: Skin color, texture, turgor normal.  No rashes or lesions. Neurologic:  Neurovascularly intact without any focal sensory/motor deficits.  Cranial nerves: II-XII intact, grossly non-focal.  Psychiatric:  Alert and oriented, thought content appropriate, normal insight  Capillary Refill: Brisk,< 3 seconds   Peripheral Pulses: +2 palpable, equal bilaterally       Labs: For convenience and continuity at follow-up the following most recent labs are provided:      CBC:    Lab Results   Component Value Date    WBC 5.6 03/06/2021    HGB 12.9 03/06/2021    HCT 39.8 03/06/2021     03/06/2021       Renal:    Lab Results   Component Value Date     03/06/2021    K 3.8 03/06/2021     03/06/2021    CO2 25 03/06/2021    BUN 12 03/06/2021    CREATININE 0.7 03/06/2021    CALCIUM 8.6 03/06/2021    PHOS 4.8 03/04/2021         Significant Diagnostic Studies    Radiology:   No orders to display          Consults:     IP CONSULT TO SOCIAL WORK  IP CONSULT TO CASE MANAGEMENT  IP CONSULT TO ADDICTION SERVICES  IP CONSULT TO PSYCHIATRY    Disposition: [place]  Condition at Discharge: Stable    Code Status:  Full Code     Patient Instructions:    Discharge lab work:    Activity: activity as tolerated  Diet: DIET GENERAL;      Follow-up visits:   Irene Ghosh MD  [address] [place] [address] [id number] [phone]    In 1 week  depression/anxiety follow up          Discharge Medications:      Ian Sandhu   Section Medication Instructions AKV:[id number]    Printed on:03/07/21 0818   Medication Information                      citalopram (CELEXA) 20 MG tablet  Take 1 tablet by mouth daily             estrogen, conjugated,-medroxyPROGESTERone (PREMPRO) 0.625-2.5 MG per tablet  Take 1 tablet by mouth daily             hydrOXYzine (ATARAX) 50 MG tablet  Take 1 tablet by mouth 3 times daily as needed for Anxiety             pantoprazole (PROTONIX) 40 MG tablet  Take 1 tablet by mouth every morning (before breakfast)             traZODone (DESYREL) 50 MG tablet  Take 1 tablet by mouth nightly as needed for Sleep                 Time Spent on discharge is more than 45 minutes in the examination, evaluation, counseling and review of medications and discharge plan. Signed: Thank you Roxanne Lim MD for the opportunity to be involved in this patient's care.     Electronically signed by Jignesh Adkins MD on 3/7/2021 at 8:18 AM

## 2021-03-08 ENCOUNTER — TELEPHONE (OUTPATIENT)
Dept: FAMILY MEDICINE CLINIC | Age: 56
End: 2021-03-08

## 2021-03-08 NOTE — TELEPHONE ENCOUNTER
[facility] Transitions Initial Follow Up Call    Outreach made within 2 business days of discharge: Yes    Patient: Clover  Patient : 1965   MRN: [id number]  Reason for Admission: There are no discharge diagnoses documented for the most recent discharge. Discharge Date: 3/7/21       Spoke with: TYRON to [facility]     Discharge department/facility: [facility]      Scheduled appointment with PCP within 7-14 days    Follow Up  No future appointments.     Tess Marion, CMA

## 2021-03-12 ENCOUNTER — TELEPHONE (OUTPATIENT)
Dept: FAMILY MEDICINE CLINIC | Age: 56
End: 2021-03-12

## 2021-03-15 NOTE — TELEPHONE ENCOUNTER
Left message for pt to call the office. The paperwork we received is not the correct paperwork to be completed.

## 2021-03-18 ENCOUNTER — OFFICE VISIT (OUTPATIENT)
Dept: FAMILY MEDICINE CLINIC | Age: 56
End: 2021-03-18
Payer: COMMERCIAL

## 2021-03-18 VITALS
WEIGHT: 128 LBS | TEMPERATURE: 96.6 F | DIASTOLIC BLOOD PRESSURE: 60 MMHG | OXYGEN SATURATION: 98 % | BODY MASS INDEX: 20.66 KG/M2 | HEART RATE: 107 BPM | RESPIRATION RATE: 16 BRPM | SYSTOLIC BLOOD PRESSURE: 120 MMHG

## 2021-03-18 DIAGNOSIS — F41.9 ANXIETY: Primary | ICD-10-CM

## 2021-03-18 DIAGNOSIS — F10.230 ALCOHOL DEPENDENCE WITH UNCOMPLICATED WITHDRAWAL (HCC): ICD-10-CM

## 2021-03-18 DIAGNOSIS — G47.9 SLEEP DISTURBANCE: ICD-10-CM

## 2021-03-18 DIAGNOSIS — F33.1 MODERATE EPISODE OF RECURRENT MAJOR DEPRESSIVE DISORDER (HCC): ICD-10-CM

## 2021-03-18 PROCEDURE — 99214 OFFICE O/P EST MOD 30 MIN: CPT | Performed by: FAMILY MEDICINE

## 2021-03-18 RX ORDER — CITALOPRAM 20 MG/1
20 TABLET ORAL DAILY
Qty: 30 TABLET | Refills: 5 | Status: SHIPPED | OUTPATIENT
Start: 2021-03-18 | End: 2021-04-28 | Stop reason: SDUPTHER

## 2021-03-18 RX ORDER — TRAZODONE HYDROCHLORIDE 50 MG/1
50 TABLET ORAL NIGHTLY PRN
Qty: 30 TABLET | Refills: 5 | Status: SHIPPED | OUTPATIENT
Start: 2021-03-18 | End: 2021-04-28 | Stop reason: SDUPTHER

## 2021-03-18 RX ORDER — LORAZEPAM 0.5 MG/1
0.5 TABLET ORAL EVERY 8 HOURS PRN
Qty: 20 TABLET | Refills: 0 | Status: SHIPPED | OUTPATIENT
Start: 2021-03-18 | End: 2021-03-25 | Stop reason: SDUPTHER

## 2021-03-18 RX ORDER — HYDROXYZINE 50 MG/1
50 TABLET, FILM COATED ORAL 3 TIMES DAILY PRN
Qty: 90 TABLET | Refills: 5 | Status: SHIPPED | OUTPATIENT
Start: 2021-03-18 | End: 2021-04-28 | Stop reason: SDUPTHER

## 2021-03-18 ASSESSMENT — ENCOUNTER SYMPTOMS
RHINORRHEA: 0
VOMITING: 0
BLOOD IN STOOL: 0
CHEST TIGHTNESS: 0
COUGH: 0
BACK PAIN: 0
WHEEZING: 0
EYE PAIN: 0
CONSTIPATION: 0
ABDOMINAL PAIN: 0
SHORTNESS OF BREATH: 0
NAUSEA: 0
SORE THROAT: 0
DIARRHEA: 0

## 2021-03-18 NOTE — PROGRESS NOTES
Penelope Foster (:  1965) is a 63 y.o. female,Established patient, here for evaluation of the following chief complaint(s): Anxiety (needs FMLA)      ASSESSMENT/PLAN:  1. Anxiety  -     hydrOXYzine (ATARAX) 50 MG tablet; Take 1 tablet by mouth 3 times daily as needed for Anxiety, Disp-90 tablet, R-5Normal  -     citalopram (CELEXA) 20 MG tablet; Take 1 tablet by mouth daily, Disp-30 tablet, R-5Normal  -     LORazepam (ATIVAN) 0.5 MG tablet; Take 1 tablet by mouth every 8 hours as needed for Anxiety for up to 30 days. F41.9, Disp-20 tablet, R-0Normal  2. Alcohol dependence with uncomplicated withdrawal (HCC)  -     LORazepam (ATIVAN) 0.5 MG tablet; Take 1 tablet by mouth every 8 hours as needed for Anxiety for up to 30 days. F41.9, Disp-20 tablet, R-0Normal  3. Sleep disturbance  -     traZODone (DESYREL) 50 MG tablet; Take 1 tablet by mouth nightly as needed for Sleep, Disp-30 tablet, R-5Normal  4. Moderate episode of recurrent major depressive disorder (HCC)  -     citalopram (CELEXA) 20 MG tablet; Take 1 tablet by mouth daily, Disp-30 tablet, R-5Normal    Controlled Substance Monitoring:    Acute and Chronic Pain Monitoring:   RX Monitoring 3/18/2021   Attestation -   Periodic Controlled Substance Monitoring Possible medication side effects, risk of tolerance/dependence & alternative treatments discussed. ;No signs of potential drug abuse or diversion identified.       -anxiety, unstable, will add prn ativan to the mix  -work slip  Off 3-8 thru 18, return . No follow-ups on file. SUBJECTIVE/OBJECTIVE:  HPI  Pt here for a check up. Reviewed BMI of 20, normal.  Recent hospitalization for depression and etoh. Is having a lot of anxiety. Hydroxyzine only minimal help. Needs FMLA paperwork. Sleep is ok ( trazodone). , current smoker, pmh reviewed. Review of Systems   Constitutional: Negative for chills, fatigue, fever and unexpected weight change.    HENT: Negative for congestion, ear pain, rhinorrhea and sore throat. Eyes: Negative for pain and visual disturbance. Respiratory: Negative for cough, chest tightness, shortness of breath and wheezing. Cardiovascular: Negative for chest pain and palpitations. Gastrointestinal: Negative for abdominal pain, blood in stool, constipation, diarrhea, nausea and vomiting. Genitourinary: Negative for difficulty urinating, frequency, hematuria and urgency. Musculoskeletal: Negative for back pain, joint swelling, myalgias and neck pain. Skin: Negative for rash. Neurological: Negative for dizziness and headaches. Hematological: Negative for adenopathy. Does not bruise/bleed easily. Psychiatric/Behavioral: Positive for dysphoric mood. Negative for behavioral problems and sleep disturbance. The patient is nervous/anxious. Physical Exam  Vitals signs and nursing note reviewed. Constitutional:       Appearance: She is well-developed. HENT:      Head: Normocephalic and atraumatic. Right Ear: External ear normal.      Left Ear: External ear normal.      Nose: Nose normal.      Mouth/Throat:      Mouth: Mucous membranes are moist.   Eyes:      Pupils: Pupils are equal, round, and reactive to light. Neck:      Musculoskeletal: Neck supple. Thyroid: No thyromegaly. Cardiovascular:      Rate and Rhythm: Normal rate and regular rhythm. Heart sounds: Normal heart sounds. Pulmonary:      Breath sounds: Normal breath sounds. No wheezing or rales. Abdominal:      General: Bowel sounds are normal.      Palpations: Abdomen is soft. Tenderness: There is no abdominal tenderness. There is no guarding or rebound. Musculoskeletal: Normal range of motion. Lymphadenopathy:      Cervical: No cervical adenopathy. Skin:     General: Skin is warm and dry. Findings: No rash. Neurological:      Mental Status: She is alert and oriented to person, place, and time.       Cranial Nerves: No cranial nerve

## 2021-03-18 NOTE — LETTER
[address] [place] [id number]  Phone: [phone]  Fax: [phone]    Layla Glass MD        March 18, 2021     Patient: Brittany Mora   YOB: 1965   Date of Visit: 3/18/2021       To Whom It May Concern: It is my medical opinion that Fatoumata Lamb may return to work on 04/19/2021. Off work 03/08/2021 through 04/18/2021 for anxiety and depression. If you have any questions or concerns, please don't hesitate to call.     Sincerely,        Layla Glass MD

## 2021-03-18 NOTE — PATIENT INSTRUCTIONS
Patient Education        Anxiety Disorder: Care Instructions  Your Care Instructions     Anxiety is a normal reaction to stress. Difficult situations can cause you to have symptoms such as sweaty palms and a nervous feeling. In an anxiety disorder, the symptoms are far more severe. Constant worry, muscle tension, trouble sleeping, nausea and diarrhea, and other symptoms can make normal daily activities difficult or impossible. These symptoms may occur for no reason, and they can affect your work, school, or social life. Medicines, counseling, and self-care can all help. Follow-up care is a key part of your treatment and safety. Be sure to make and go to all appointments, and call your doctor if you are having problems. It's also a good idea to know your test results and keep a list of the medicines you take. How can you care for yourself at home? · Take medicines exactly as directed. Call your doctor if you think you are having a problem with your medicine. · Go to your counseling sessions and follow-up appointments. · Recognize and accept your anxiety. Then, when you are in a situation that makes you anxious, say to yourself, \"This is not an emergency. I feel uncomfortable, but I am not in danger. I can keep going even if I feel anxious. \"  · Be kind to your body:  ? Relieve tension with exercise or a massage. ? Get enough rest.  ? Avoid alcohol, caffeine, nicotine, and illegal drugs. They can increase your anxiety level and cause sleep problems. ? Learn and do relaxation techniques. See below for more about these techniques. · Engage your mind. Get out and do something you enjoy. Go to a funny movie, or take a walk or hike. Plan your day. Having too much or too little to do can make you anxious. · Keep a record of your symptoms. Discuss your fears with a good friend or family member, or join a support group for people with similar problems. Talking to others sometimes relieves stress.   · Get involved in social groups, or volunteer to help others. Being alone sometimes makes things seem worse than they are. · Get at least 30 minutes of exercise on most days of the week to relieve stress. Walking is a good choice. You also may want to do other activities, such as running, swimming, cycling, or playing tennis or team sports. Relaxation techniques  Do relaxation exercises 10 to 20 minutes a day. You can play soothing, relaxing music while you do them, if you wish. · Tell others in your house that you are going to do your relaxation exercises. Ask them not to disturb you. · Find a comfortable place, away from all distractions and noise. · Lie down on your back, or sit with your back straight. · Focus on your breathing. Make it slow and steady. · Breathe in through your nose. Breathe out through either your nose or mouth. · Breathe deeply, filling up the area between your navel and your rib cage. Breathe so that your belly goes up and down. · Do not hold your breath. · Breathe like this for 5 to 10 minutes. Notice the feeling of calmness throughout your whole body. As you continue to breathe slowly and deeply, relax by doing the following for another 5 to 10 minutes:  · Tighten and relax each muscle group in your body. You can begin at your toes and work your way up to your head. · Imagine your muscle groups relaxing and becoming heavy. · Empty your mind of all thoughts. · Let yourself relax more and more deeply. · Become aware of the state of calmness that surrounds you. · When your relaxation time is over, you can bring yourself back to alertness by moving your fingers and toes and then your hands and feet and then stretching and moving your entire body. Sometimes people fall asleep during relaxation, but they usually wake up shortly afterward. · Always give yourself time to return to full alertness before you drive a car or do anything that might cause an accident if you are not fully alert.  Never play a relaxation tape while you drive a car. When should you call for help? Call 911 anytime you think you may need emergency care. For example, call if:    · You feel you cannot stop from hurting yourself or someone else. Keep the numbers for these national suicide hotlines: 3-353-678-TALK (3-188.467.9508) and 7-085-QWJPQAQ (4-605.669.3404). If you or someone you know talks about suicide or feeling hopeless, get help right away. Watch closely for changes in your health, and be sure to contact your doctor if:    · You have anxiety or fear that affects your life.     · You have symptoms of anxiety that are new or different from those you had before. Where can you learn more? Go to https://[employer]. org and sign in to your [employer] account. Enter P754 in the [employer] box to learn more about \"Anxiety Disorder: Care Instructions. \"     If you do not have an account, please click on the \"Sign Up Now\" link. Current as of: September 23, 2020               Content Version: 12.8  © 2006-2021 [employer]. Care instructions adapted under license by [facility] ([facility]). If you have questions about a medical condition or this instruction, always ask your healthcare professional. [address] any warranty or liability for your use of this information. Patient Education        Learning About Alcohol Withdrawal  What is alcohol withdrawal?     If you drink alcohol regularly (more than a few drinks on most days) and then suddenly stop or cut down, you may go through some physical and emotional problems while the alcohol clears out of your system. This is called withdrawal. Clearing the alcohol from your body is called detoxification, or detox. What are the symptoms? Symptoms of alcohol withdrawal may start as soon as 4 to 12 hours after you stop drinking. Or they may not start until several days after the last drink.   Mild symptoms include:  · Nausea. · Sweating. · Shakiness. · Diarrhea. · Intense worry. · Disturbed sleep. · Headache. More severe symptoms include:  · Vomiting or belly pain. · Being confused, upset, and irritable. · Changed sensations. You might feel things on your body that aren't really there. Or you may see or hear things that aren't there. · Trembling. · Being short of breath or having pain in your chest.  · Having seizures. Symptoms may peak within a few days. Mild symptoms can last for a few weeks. If your symptoms are severe, you'll need to see a doctor. What is the treatment for alcohol withdrawal?  Most people may be able to cut down or stop drinking with only mild withdrawal. They can stay safe by simply resting, drinking lots of fluids, and eating healthy foods. But people who drink large amounts of alcohol or are at risk for severe withdrawal symptoms should not try to detox at home unless they work closely with a doctor to manage it. A person can die of severe alcohol withdrawal.  Before you stop drinking, talk to your doctor about how you plan to stop. Be completely honest about how much you've been drinking. Your doctor will figure out if you need to detox in a medical center. You may get medicine to treat the symptoms whether you are at home or in a medical center. Medicine that treats seizures can also help. Your doctor will explain what types of medicine might help you. You may start with a high dose and then take smaller amounts over several days. There's also medicine that can help you avoid alcohol while you recover. How can you manage your withdrawal and recovery? Here are a few tips that can help you to not start drinking again. · Make sure there's no alcohol in the house. This includes drinks as well as liquid medicines, rubbing alcohol, and certain flavorings like vanilla extract. · Try not to hang out with people you used to drink with. · Don't go it alone.  Spend time with people who support Now\" link. Current as of: June 29, 2020               Content Version: 12.8  © 3370-1844 [employer]. Care instructions adapted under license by [facility] ([facility]). If you have questions about a medical condition or this instruction, always ask your healthcare professional. [address] any warranty or liability for your use of this information.

## 2021-03-25 ENCOUNTER — TELEPHONE (OUTPATIENT)
Dept: FAMILY MEDICINE CLINIC | Age: 56
End: 2021-03-25

## 2021-03-25 DIAGNOSIS — F10.230 ALCOHOL DEPENDENCE WITH UNCOMPLICATED WITHDRAWAL (HCC): ICD-10-CM

## 2021-03-25 DIAGNOSIS — F41.9 ANXIETY: ICD-10-CM

## 2021-03-25 RX ORDER — LORAZEPAM 0.5 MG/1
0.5 TABLET ORAL EVERY 8 HOURS PRN
Qty: 45 TABLET | Refills: 0 | Status: SHIPPED | OUTPATIENT
Start: 2021-03-25 | End: 2021-04-15 | Stop reason: SDUPTHER

## 2021-04-15 ENCOUNTER — OFFICE VISIT (OUTPATIENT)
Dept: FAMILY MEDICINE CLINIC | Age: 56
End: 2021-04-15
Payer: COMMERCIAL

## 2021-04-15 VITALS
HEART RATE: 94 BPM | DIASTOLIC BLOOD PRESSURE: 76 MMHG | OXYGEN SATURATION: 98 % | BODY MASS INDEX: 22.4 KG/M2 | WEIGHT: 138.8 LBS | SYSTOLIC BLOOD PRESSURE: 120 MMHG | TEMPERATURE: 97.2 F | RESPIRATION RATE: 18 BRPM

## 2021-04-15 DIAGNOSIS — F41.9 ANXIETY: Primary | ICD-10-CM

## 2021-04-15 DIAGNOSIS — F10.230 ALCOHOL DEPENDENCE WITH UNCOMPLICATED WITHDRAWAL (HCC): ICD-10-CM

## 2021-04-15 DIAGNOSIS — G47.9 SLEEP DISTURBANCE: ICD-10-CM

## 2021-04-15 PROCEDURE — 99213 OFFICE O/P EST LOW 20 MIN: CPT | Performed by: FAMILY MEDICINE

## 2021-04-15 RX ORDER — LORAZEPAM 0.5 MG/1
0.5 TABLET ORAL EVERY 8 HOURS PRN
Qty: 45 TABLET | Refills: 1 | Status: SHIPPED | OUTPATIENT
Start: 2021-04-15 | End: 2021-06-21 | Stop reason: SDUPTHER

## 2021-04-15 ASSESSMENT — ENCOUNTER SYMPTOMS
SORE THROAT: 0
BLOOD IN STOOL: 0
SHORTNESS OF BREATH: 0
DIARRHEA: 1
EYE PAIN: 0
CHEST TIGHTNESS: 0
RHINORRHEA: 0
ABDOMINAL PAIN: 0
COUGH: 0
BACK PAIN: 0
VOMITING: 0
NAUSEA: 0
WHEEZING: 0
CONSTIPATION: 0

## 2021-04-15 NOTE — LETTER
[address] [address]  Phone: [phone]  Fax: [phone]    Waldemar Clemens MD        April 15, 2021     Patient: Maikel Guzman   YOB: 1965   Date of Visit: 4/15/2021       To Whom It May Concern: It is my medical opinion that Dereje Fuentes may return to work on 04/19/2021. Limit of 8 hours per shift due to anxiety. Indefinite restriction. If you have any questions or concerns, please don't hesitate to call.     Sincerely,        Waldemar Clemens MD

## 2021-04-15 NOTE — PATIENT INSTRUCTIONS

## 2021-04-15 NOTE — PROGRESS NOTES
Sonali Lawrence (:  1965) is a 63 y.o. female,Established patient, here for evaluation of the following chief complaint(s): Anxiety (would like to discuss her medications)      ASSESSMENT/PLAN:  1. Anxiety  -     LORazepam (ATIVAN) 0.5 MG tablet; Take 1 tablet by mouth every 8 hours as needed for Anxiety for up to 30 days. F41.9, Disp-45 tablet, R-1Normal  2. Sleep disturbance  3. Alcohol dependence with uncomplicated withdrawal (HCC)  -     LORazepam (ATIVAN) 0.5 MG tablet; Take 1 tablet by mouth every 8 hours as needed for Anxiety for up to 30 days. F41.9, Disp-45 tablet, R-1Normal  -overall improving, not working at this time. No follow-ups on file. SUBJECTIVE/OBJECTIVE:  HPI   Pt here for anxiety follow up. Reviewed BMI of 22, normal.  Is on citalopram, trazodone and hydroxyzine. Needs refill of ativan. Overall meds are helping. Some diarrhea, no other side effects. , current smoker, pmh reviewed. Review of Systems   Constitutional: Negative for chills, fatigue, fever and unexpected weight change. HENT: Negative for congestion, ear pain, rhinorrhea and sore throat. Eyes: Negative for pain and visual disturbance. Respiratory: Negative for cough, chest tightness, shortness of breath and wheezing. Cardiovascular: Negative for chest pain and palpitations. Gastrointestinal: Positive for diarrhea. Negative for abdominal pain, blood in stool, constipation, nausea and vomiting. Genitourinary: Negative for difficulty urinating, frequency, hematuria and urgency. Musculoskeletal: Negative for back pain, joint swelling, myalgias and neck pain. Skin: Negative for rash. Neurological: Negative for dizziness and headaches. Hematological: Negative for adenopathy. Does not bruise/bleed easily. Psychiatric/Behavioral: Positive for sleep disturbance. Negative for behavioral problems. The patient is nervous/anxious.         Physical Exam  Vitals signs and nursing note reviewed. Constitutional:       Appearance: She is well-developed. HENT:      Head: Normocephalic and atraumatic. Right Ear: External ear normal.      Left Ear: External ear normal.      Nose: Nose normal.      Mouth/Throat:      Mouth: Mucous membranes are moist.   Eyes:      Pupils: Pupils are equal, round, and reactive to light. Neck:      Musculoskeletal: Neck supple. Thyroid: No thyromegaly. Cardiovascular:      Rate and Rhythm: Normal rate and regular rhythm. Heart sounds: Normal heart sounds. Pulmonary:      Breath sounds: Normal breath sounds. No wheezing or rales. Abdominal:      General: Bowel sounds are normal.      Palpations: Abdomen is soft. Tenderness: There is no abdominal tenderness. There is no guarding or rebound. Musculoskeletal: Normal range of motion. Lymphadenopathy:      Cervical: No cervical adenopathy. Skin:     General: Skin is warm and dry. Findings: No rash. Neurological:      Mental Status: She is alert and oriented to person, place, and time. Cranial Nerves: No cranial nerve deficit. Deep Tendon Reflexes: Reflexes are normal and symmetric. An electronic signature was used to authenticate this note.     --Randall Partida MD

## 2021-04-28 DIAGNOSIS — G47.9 SLEEP DISTURBANCE: ICD-10-CM

## 2021-04-28 DIAGNOSIS — N95.1 MENOPAUSAL SYMPTOMS: ICD-10-CM

## 2021-04-28 DIAGNOSIS — F41.9 ANXIETY: ICD-10-CM

## 2021-04-28 DIAGNOSIS — F33.1 MODERATE EPISODE OF RECURRENT MAJOR DEPRESSIVE DISORDER (HCC): ICD-10-CM

## 2021-04-28 RX ORDER — CITALOPRAM 20 MG/1
20 TABLET ORAL DAILY
Qty: 30 TABLET | Refills: 5 | Status: SHIPPED | OUTPATIENT
Start: 2021-04-28

## 2021-04-28 RX ORDER — TRAZODONE HYDROCHLORIDE 50 MG/1
50 TABLET ORAL NIGHTLY PRN
Qty: 30 TABLET | Refills: 5 | Status: SHIPPED | OUTPATIENT
Start: 2021-04-28 | End: 2021-10-22 | Stop reason: SDUPTHER

## 2021-04-28 RX ORDER — BUSPIRONE HYDROCHLORIDE 10 MG/1
10 TABLET ORAL 2 TIMES DAILY
Qty: 60 TABLET | Refills: 5 | Status: SHIPPED | OUTPATIENT
Start: 2021-04-28 | End: 2021-11-02 | Stop reason: SDUPTHER

## 2021-04-28 RX ORDER — HYDROXYZINE 50 MG/1
50 TABLET, FILM COATED ORAL 3 TIMES DAILY PRN
Qty: 90 TABLET | Refills: 5 | Status: SHIPPED | OUTPATIENT
Start: 2021-04-28 | End: 2021-07-08 | Stop reason: SDUPTHER

## 2021-04-28 RX ORDER — ESTROGEN,CON/M-PROGEST ACET 0.625-2.5
1 TABLET ORAL DAILY
Qty: 30 TABLET | Refills: 5 | Status: SHIPPED | OUTPATIENT
Start: 2021-04-28

## 2021-04-28 NOTE — TELEPHONE ENCOUNTER
Nico Caro from [address] [place] [address], [address] called ([phone]). Odalis Deutsch had on speaker phone, I was able to hear Pt talking and answering questions. Pt admitted herself to their program, lasts 9-12 months. Pt will need new prescriptions sent to [facility]. Pt is not allowed to have ativan, is there anything else she can try. Pt said she has taken Buspar from counseling services. This medication is approved for facility.     Maureen Kellying Cornell needs refill of   Requested Prescriptions     Pending Prescriptions Disp Refills    traZODone (DESYREL) 50 MG tablet 30 tablet 5     Sig: Take 1 tablet by mouth nightly as needed for Sleep    hydrOXYzine (ATARAX) 50 MG tablet 90 tablet 5     Sig: Take 1 tablet by mouth 3 times daily as needed for Anxiety    citalopram (CELEXA) 20 MG tablet 30 tablet 5     Sig: Take 1 tablet by mouth daily    estrogen, conjugated,-medroxyPROGESTERone (PREMPRO) 0.625-2.5 MG per tablet 30 tablet 2     Sig: Take 1 tablet by mouth daily       Last Visit Date:  4/15/2021    Next Visit Date:    Visit date not found

## 2021-05-19 ENCOUNTER — TELEPHONE (OUTPATIENT)
Dept: FAMILY MEDICINE CLINIC | Age: 56
End: 2021-05-19

## 2021-05-19 NOTE — LETTER
Arun Art MD  [address] [facility] [address]  Phone: [phone]  Fax: [phone]        May 19, 2021     Hilary Roberts  [address] [place] [address]   [facility] [address]      Dear Kartik Foster:    Our records indicate that you are due for a mammogram.    In the [place], one in nine women will develop breast cancer during their lifetime. While there is no way to prevent breast cancer, early detection provides the best opportunity for curing it. For women over the age of 36, the [address] [place] [address] Ave recommends a yearly clinical breast exam and a yearly mammogram. These practices have saved thousands of lives. We need your help to ensure that you are receiving optimal medical care. Please make an appointment for a mammogram at your earliest convenience.     Sincerely,        Arun Art MD

## 2021-06-21 ENCOUNTER — TELEPHONE (OUTPATIENT)
Dept: FAMILY MEDICINE CLINIC | Age: 56
End: 2021-06-21

## 2021-06-21 DIAGNOSIS — F10.230 ALCOHOL DEPENDENCE WITH UNCOMPLICATED WITHDRAWAL (HCC): ICD-10-CM

## 2021-06-21 DIAGNOSIS — F41.9 ANXIETY: ICD-10-CM

## 2021-06-21 RX ORDER — LORAZEPAM 0.5 MG/1
0.5 TABLET ORAL EVERY 8 HOURS PRN
Qty: 45 TABLET | Refills: 1 | Status: SHIPPED | OUTPATIENT
Start: 2021-06-21 | End: 2021-07-27 | Stop reason: SDUPTHER

## 2021-06-21 NOTE — TELEPHONE ENCOUNTER
----- Message from Rudolph Schirmer sent at 6/21/2021  9:52 AM EDT -----  Subject: Refill Request    QUESTIONS  Name of Medication? LORazepam (ATIVAN) 0.5 MG tablet  Patient-reported dosage and instructions? 1 0.5mg tab taken 2x daily   How many days do you have left? 0  Preferred Pharmacy? [address] [facility] phone number (if available)? [phone]  ---------------------------------------------------------------------------  --------------  CALL BACK INFO  What is the best way for the office to contact you? OK to leave message on   voicemail  Preferred Call Back Phone Number?  [phone]

## 2021-06-21 NOTE — TELEPHONE ENCOUNTER
ep'ed ativan to pharm requested      Controlled Substance Monitoring:    Acute and Chronic Pain Monitoring:   RX Monitoring 6/21/2021   Attestation -   Periodic Controlled Substance Monitoring No signs of potential drug abuse or diversion identified.

## 2021-07-08 DIAGNOSIS — F41.9 ANXIETY: ICD-10-CM

## 2021-07-08 RX ORDER — HYDROXYZINE 50 MG/1
50 TABLET, FILM COATED ORAL 3 TIMES DAILY PRN
Qty: 90 TABLET | Refills: 5 | Status: SHIPPED | OUTPATIENT
Start: 2021-07-08

## 2021-07-08 NOTE — TELEPHONE ENCOUNTER
Patient called to request a refill of Hydroxyzine. She was last seen in the office on 4/15/21. She uses [facility].   Call her back at [phone] if any problems

## 2021-07-27 ENCOUNTER — OFFICE VISIT (OUTPATIENT)
Dept: FAMILY MEDICINE CLINIC | Age: 56
End: 2021-07-27
Payer: COMMERCIAL

## 2021-07-27 VITALS
RESPIRATION RATE: 16 BRPM | HEIGHT: 66 IN | BODY MASS INDEX: 21.24 KG/M2 | HEART RATE: 100 BPM | DIASTOLIC BLOOD PRESSURE: 66 MMHG | SYSTOLIC BLOOD PRESSURE: 126 MMHG | OXYGEN SATURATION: 97 % | TEMPERATURE: 97.6 F | WEIGHT: 132.2 LBS

## 2021-07-27 DIAGNOSIS — Z13.31 POSITIVE DEPRESSION SCREENING: ICD-10-CM

## 2021-07-27 DIAGNOSIS — F33.1 MODERATE EPISODE OF RECURRENT MAJOR DEPRESSIVE DISORDER (HCC): ICD-10-CM

## 2021-07-27 DIAGNOSIS — F41.9 ANXIETY: Primary | ICD-10-CM

## 2021-07-27 DIAGNOSIS — F10.230 ALCOHOL DEPENDENCE WITH UNCOMPLICATED WITHDRAWAL (HCC): ICD-10-CM

## 2021-07-27 PROCEDURE — G8431 POS CLIN DEPRES SCRN F/U DOC: HCPCS | Performed by: FAMILY MEDICINE

## 2021-07-27 PROCEDURE — 99214 OFFICE O/P EST MOD 30 MIN: CPT | Performed by: FAMILY MEDICINE

## 2021-07-27 RX ORDER — LORAZEPAM 0.5 MG/1
0.5 TABLET ORAL EVERY 8 HOURS PRN
Qty: 45 TABLET | Refills: 1 | Status: SHIPPED | OUTPATIENT
Start: 2021-07-27 | End: 2021-08-30 | Stop reason: SDUPTHER

## 2021-07-27 SDOH — ECONOMIC STABILITY: FOOD INSECURITY: WITHIN THE PAST 12 MONTHS, YOU WORRIED THAT YOUR FOOD WOULD RUN OUT BEFORE YOU GOT MONEY TO BUY MORE.: NEVER TRUE

## 2021-07-27 SDOH — ECONOMIC STABILITY: FOOD INSECURITY: WITHIN THE PAST 12 MONTHS, THE FOOD YOU BOUGHT JUST DIDN'T LAST AND YOU DIDN'T HAVE MONEY TO GET MORE.: NEVER TRUE

## 2021-07-27 ASSESSMENT — ENCOUNTER SYMPTOMS
SHORTNESS OF BREATH: 0
BACK PAIN: 0
ABDOMINAL PAIN: 0
SORE THROAT: 0
WHEEZING: 0
CONSTIPATION: 0
EYE PAIN: 0
RHINORRHEA: 0
DIARRHEA: 0
COUGH: 0
VOMITING: 0
BLOOD IN STOOL: 0
NAUSEA: 0
CHEST TIGHTNESS: 0

## 2021-07-27 ASSESSMENT — COLUMBIA-SUICIDE SEVERITY RATING SCALE - C-SSRS
1. WITHIN THE PAST MONTH, HAVE YOU WISHED YOU WERE DEAD OR WISHED YOU COULD GO TO SLEEP AND NOT WAKE UP?: NO
6. HAVE YOU EVER DONE ANYTHING, STARTED TO DO ANYTHING, OR PREPARED TO DO ANYTHING TO END YOUR LIFE?: NO
2. HAVE YOU ACTUALLY HAD ANY THOUGHTS OF KILLING YOURSELF?: NO

## 2021-07-27 ASSESSMENT — PATIENT HEALTH QUESTIONNAIRE - PHQ9
SUM OF ALL RESPONSES TO PHQ9 QUESTIONS 1 & 2: 6
10. IF YOU CHECKED OFF ANY PROBLEMS, HOW DIFFICULT HAVE THESE PROBLEMS MADE IT FOR YOU TO DO YOUR WORK, TAKE CARE OF THINGS AT HOME, OR GET ALONG WITH OTHER PEOPLE: 3
6. FEELING BAD ABOUT YOURSELF - OR THAT YOU ARE A FAILURE OR HAVE LET YOURSELF OR YOUR FAMILY DOWN: 3
SUM OF ALL RESPONSES TO PHQ QUESTIONS 1-9: 18
5. POOR APPETITE OR OVEREATING: 0
SUM OF ALL RESPONSES TO PHQ QUESTIONS 1-9: 18
4. FEELING TIRED OR HAVING LITTLE ENERGY: 3
2. FEELING DOWN, DEPRESSED OR HOPELESS: 3
1. LITTLE INTEREST OR PLEASURE IN DOING THINGS: 3
9. THOUGHTS THAT YOU WOULD BE BETTER OFF DEAD, OR OF HURTING YOURSELF: 0
SUM OF ALL RESPONSES TO PHQ QUESTIONS 1-9: 18
8. MOVING OR SPEAKING SO SLOWLY THAT OTHER PEOPLE COULD HAVE NOTICED. OR THE OPPOSITE, BEING SO FIGETY OR RESTLESS THAT YOU HAVE BEEN MOVING AROUND A LOT MORE THAN USUAL: 0
7. TROUBLE CONCENTRATING ON THINGS, SUCH AS READING THE NEWSPAPER OR WATCHING TELEVISION: 3
3. TROUBLE FALLING OR STAYING ASLEEP: 3

## 2021-07-27 ASSESSMENT — SOCIAL DETERMINANTS OF HEALTH (SDOH): HOW HARD IS IT FOR YOU TO PAY FOR THE VERY BASICS LIKE FOOD, HOUSING, MEDICAL CARE, AND HEATING?: NOT HARD AT ALL

## 2021-07-27 NOTE — PATIENT INSTRUCTIONS
Patient Education        Anxiety Disorder: Care Instructions  Your Care Instructions     Anxiety is a normal reaction to stress. Difficult situations can cause you to have symptoms such as sweaty palms and a nervous feeling. In an anxiety disorder, the symptoms are far more severe. Constant worry, muscle tension, trouble sleeping, nausea and diarrhea, and other symptoms can make normal daily activities difficult or impossible. These symptoms may occur for no reason, and they can affect your work, school, or social life. Medicines, counseling, and self-care can all help. Follow-up care is a key part of your treatment and safety. Be sure to make and go to all appointments, and call your doctor if you are having problems. It's also a good idea to know your test results and keep a list of the medicines you take. How can you care for yourself at home? · Take medicines exactly as directed. Call your doctor if you think you are having a problem with your medicine. · Go to your counseling sessions and follow-up appointments. · Recognize and accept your anxiety. Then, when you are in a situation that makes you anxious, say to yourself, \"This is not an emergency. I feel uncomfortable, but I am not in danger. I can keep going even if I feel anxious. \"  · Be kind to your body:  ? Relieve tension with exercise or a massage. ? Get enough rest.  ? Avoid alcohol, caffeine, nicotine, and illegal drugs. They can increase your anxiety level and cause sleep problems. ? Learn and do relaxation techniques. See below for more about these techniques. · Engage your mind. Get out and do something you enjoy. Go to a funny movie, or take a walk or hike. Plan your day. Having too much or too little to do can make you anxious. · Keep a record of your symptoms. Discuss your fears with a good friend or family member, or join a support group for people with similar problems. Talking to others sometimes relieves stress.   · Get involved in social groups, or volunteer to help others. Being alone sometimes makes things seem worse than they are. · Get at least 30 minutes of exercise on most days of the week to relieve stress. Walking is a good choice. You also may want to do other activities, such as running, swimming, cycling, or playing tennis or team sports. Relaxation techniques  Do relaxation exercises 10 to 20 minutes a day. You can play soothing, relaxing music while you do them, if you wish. · Tell others in your house that you are going to do your relaxation exercises. Ask them not to disturb you. · Find a comfortable place, away from all distractions and noise. · Lie down on your back, or sit with your back straight. · Focus on your breathing. Make it slow and steady. · Breathe in through your nose. Breathe out through either your nose or mouth. · Breathe deeply, filling up the area between your navel and your rib cage. Breathe so that your belly goes up and down. · Do not hold your breath. · Breathe like this for 5 to 10 minutes. Notice the feeling of calmness throughout your whole body. As you continue to breathe slowly and deeply, relax by doing the following for another 5 to 10 minutes:  · Tighten and relax each muscle group in your body. You can begin at your toes and work your way up to your head. · Imagine your muscle groups relaxing and becoming heavy. · Empty your mind of all thoughts. · Let yourself relax more and more deeply. · Become aware of the state of calmness that surrounds you. · When your relaxation time is over, you can bring yourself back to alertness by moving your fingers and toes and then your hands and feet and then stretching and moving your entire body. Sometimes people fall asleep during relaxation, but they usually wake up shortly afterward. · Always give yourself time to return to full alertness before you drive a car or do anything that might cause an accident if you are not fully alert.  Never play a relaxation tape while you drive a car. When should you call for help? Call 911 anytime you think you may need emergency care. For example, call if:    · You feel you cannot stop from hurting yourself or someone else. Keep the numbers for these national suicide hotlines: 4-840-243-TALK (5-942.990.3740) and 8-678-UNYYITO (4-246.587.1284). If you or someone you know talks about suicide or feeling hopeless, get help right away. Watch closely for changes in your health, and be sure to contact your doctor if:    · You have anxiety or fear that affects your life.     · You have symptoms of anxiety that are new or different from those you had before. Where can you learn more? Go to https://[employer].Soicos. org and sign in to your Nano account. Enter P754 in the AppCard box to learn more about \"Anxiety Disorder: Care Instructions. \"     If you do not have an account, please click on the \"Sign Up Now\" link. Current as of: September 23, 2020               Content Version: 12.9  © 2006-2021 [employer]. Care instructions adapted under license by [facility] ([facility]). If you have questions about a medical condition or this instruction, always ask your healthcare professional. [address] any warranty or liability for your use of this information. Patient Education        Depression Treatment: Care Instructions  Your Care Instructions     Depression is a condition that affects the way you feel, think, and act. It causes symptoms such as low energy, loss of interest in daily activities, and sadness or grouchiness that goes on for a long time. Depression is very common and affects men and women of all ages. Depression is a medical illness caused by changes in the natural chemicals in your brain. It is not a character flaw, and it does not mean that you are a bad or weak person. It does not mean that you are going crazy.   It is important to know that depression can be treated. Medicines, counseling, and self-care can all help. Many people do not get help because they are embarrassed or think that they will get over the depression on their own. But some people do not get better without treatment. Follow-up care is a key part of your treatment and safety. Be sure to make and go to all appointments, and call your doctor if you are having problems. It's also a good idea to know your test results and keep a list of the medicines you take. How can you care for yourself at home? Learn about antidepressant medicines  Antidepressant medicines can improve or end the symptoms of depression. You may need to take the medicine for at least 6 months, and often longer. Keep taking your medicine even if you feel better. If you stop taking it too soon, your symptoms may come back or get worse. You may start to feel better within 1 to 3 weeks of taking antidepressant medicine. But it can take as many as 6 to 8 weeks to see more improvement. Talk to your doctor if you have problems with your medicine or if you do not notice any improvement after 3 weeks. Antidepressants can make you feel tired, dizzy, or nervous. Some people have dry mouth, constipation, headaches, sexual problems, an upset stomach, or diarrhea. Many of these side effects are mild and go away on their own after you take the medicine for a few weeks. Some may last longer. Talk to your doctor if side effects bother you too much. You might be able to try a different medicine. If you are pregnant or breastfeeding, talk to your doctor about what medicines you can take. Learn about counseling  In many cases, counseling can work as well as medicines to treat mild to moderate depression. Counseling is done by licensed mental health providers, such as psychologists, social workers, and some types of nurses. It can be done in one-on-one sessions or in a group setting.  Many people find group sessions helpful. Cognitive-behavioral therapy is a type of counseling. In this treatment, you learn how to see and change unhelpful thinking styles that may be adding to your depression. Counseling and medicines often work well when used together. When should you call for help? Call 911 anytime you think you may need emergency care. For example, call if:    · You feel you cannot stop from hurting yourself or someone else. Call your doctor now or seek immediate medical care if:    · You hear voices.     · You feel much more depressed. Watch closely for changes in your health, and be sure to contact your doctor if:    · You are having problems with your depression medicine.     · You are not getting better as expected. Where can you learn more? Go to [email or web address].com. org and sign in to your WiLinx account. Enter R010 in the Engagor box to learn more about \"Depression Treatment: Care Instructions. \"     If you do not have an account, please click on the \"Sign Up Now\" link. Current as of: September 23, 2020               Content Version: 12.9  © 2006-2021 Healthwise, Incorporated. Care instructions adapted under license by [facility] ([facility]). If you have questions about a medical condition or this instruction, always ask your healthcare professional. [address] any warranty or liability for your use of this information.

## 2021-07-27 NOTE — PROGRESS NOTES
Dena Oliva (:  1965) is a 61 y.o. female,Established patient, here for evaluation of the following chief complaint(s):  Skin Lesion (mole in the middle of her chest)         ASSESSMENT/PLAN:  1. Anxiety  -     LORazepam (ATIVAN) 0.5 MG tablet; Take 1 tablet by mouth every 8 hours as needed for Anxiety for up to 30 days. F41.9, Disp-45 tablet, R-1Normal  -stable on ativan  2. Alcohol dependence with uncomplicated withdrawal (HCC)  -     LORazepam (ATIVAN) 0.5 MG tablet; Take 1 tablet by mouth every 8 hours as needed for Anxiety for up to 30 days. F41.9, Disp-45 tablet, R-1Normal  -stable on ativan  3. Positive depression screening  -     Positive Screen for Clinical Depression with a Documented Follow-up Plan   4. Moderate episode of recurrent major depressive disorder ([facility] [address]  -stable on citalopram, seeing counselor also    No follow-ups on file. Subjective   SUBJECTIVE/OBJECTIVE:  HPI   Pt here for a check up. REviewed BMI of 21. Normal.  Having anxiety and depression. Sees counselor. Needs refill of ativan. , current smoker, pmh reviewed. Review of Systems   Constitutional: Negative for chills, fatigue, fever and unexpected weight change. HENT: Negative for congestion, ear pain, rhinorrhea and sore throat. Eyes: Negative for pain and visual disturbance. Respiratory: Negative for cough, chest tightness, shortness of breath and wheezing. Cardiovascular: Negative for chest pain and palpitations. Gastrointestinal: Negative for abdominal pain, blood in stool, constipation, diarrhea, nausea and vomiting. Genitourinary: Negative for difficulty urinating, frequency, hematuria and urgency. Musculoskeletal: Negative for back pain, joint swelling, myalgias and neck pain. Skin: Negative for rash. Neurological: Negative for dizziness and headaches. Hematological: Negative for adenopathy. Does not bruise/bleed easily.    Psychiatric/Behavioral: Positive for dysphoric mood and sleep disturbance. Negative for behavioral problems. The patient is nervous/anxious. Objective   Physical Exam  Vitals and nursing note reviewed. Constitutional:       Appearance: She is well-developed. HENT:      Head: Normocephalic and atraumatic. Right Ear: External ear normal.      Left Ear: External ear normal.      Nose: Nose normal.      Mouth/Throat:      Mouth: Mucous membranes are moist.   Eyes:      Pupils: Pupils are equal, round, and reactive to light. Neck:      Thyroid: No thyromegaly. Cardiovascular:      Rate and Rhythm: Normal rate and regular rhythm. Heart sounds: Normal heart sounds. Pulmonary:      Breath sounds: Normal breath sounds. No wheezing or rales. Abdominal:      General: Bowel sounds are normal.      Palpations: Abdomen is soft. Tenderness: There is no abdominal tenderness. There is no guarding or rebound. Musculoskeletal:         General: Normal range of motion. Cervical back: Neck supple. Lymphadenopathy:      Cervical: No cervical adenopathy. Skin:     General: Skin is warm and dry. Findings: No rash. Neurological:      Mental Status: She is alert and oriented to person, place, and time. Cranial Nerves: No cranial nerve deficit. Deep Tendon Reflexes: Reflexes are normal and symmetric. Psychiatric:         Mood and Affect: Mood is anxious. An electronic signature was used to authenticate this note. --Merle Fermin MD On the basis of positive PHQ-9 screening (PHQ-9 Total Score: 18), the following plan was implemented: on meds, sees counselor.   Patient will follow-up in 3 week(s) with psychologist.

## 2021-08-30 DIAGNOSIS — F10.230 ALCOHOL DEPENDENCE WITH UNCOMPLICATED WITHDRAWAL (HCC): ICD-10-CM

## 2021-08-30 DIAGNOSIS — F41.9 ANXIETY: ICD-10-CM

## 2021-08-30 RX ORDER — LORAZEPAM 0.5 MG/1
0.5 TABLET ORAL EVERY 8 HOURS PRN
Qty: 45 TABLET | Refills: 1 | Status: SHIPPED | OUTPATIENT
Start: 2021-08-30 | End: 2021-09-30 | Stop reason: SDUPTHER

## 2021-08-30 NOTE — TELEPHONE ENCOUNTER
ep'ed ativan to pharm requested    Controlled Substance Monitoring:    Acute and Chronic Pain Monitoring:   RX Monitoring 8/30/2021   Attestation -   Periodic Controlled Substance Monitoring No signs of potential drug abuse or diversion identified.

## 2021-08-30 NOTE — TELEPHONE ENCOUNTER
Mars Sarabia needs refill of   Requested Prescriptions     Pending Prescriptions Disp Refills    LORazepam (ATIVAN) 0.5 MG tablet 45 tablet 1     Sig: Take 1 tablet by mouth every 8 hours as needed for Anxiety for up to 30 days. F41.9       Last Filled on:  7/27/21    Last Visit Date: 7/27/2021 anxiety    Next Visit Date:  Visit date not found    Pt has #3 left for today. Pls call pt at [phone] only if probs.     Zip: [id number]

## 2021-09-23 ENCOUNTER — OFFICE VISIT (OUTPATIENT)
Dept: FAMILY MEDICINE CLINIC | Age: 56
End: 2021-09-23
Payer: COMMERCIAL

## 2021-09-23 VITALS
SYSTOLIC BLOOD PRESSURE: 112 MMHG | HEART RATE: 84 BPM | BODY MASS INDEX: 21.56 KG/M2 | TEMPERATURE: 96.8 F | DIASTOLIC BLOOD PRESSURE: 70 MMHG | RESPIRATION RATE: 12 BRPM | WEIGHT: 133.6 LBS

## 2021-09-23 DIAGNOSIS — R30.0 DYSURIA: Primary | ICD-10-CM

## 2021-09-23 LAB
BILIRUBIN URINE: NEGATIVE
BLOOD URINE, POC: NEGATIVE
CHARACTER, URINE: CLEAR
COLOR, URINE: YELLOW
GLUCOSE URINE: NEGATIVE MG/DL
KETONES, URINE: NEGATIVE
LEUKOCYTE CLUMPS, URINE: NEGATIVE
NITRITE, URINE: NEGATIVE
PH, URINE: 6 (ref 5–9)
PROTEIN, URINE: NEGATIVE MG/DL
SPECIFIC GRAVITY, URINE: >= 1.03 (ref 1–1.03)
UROBILINOGEN, URINE: 0.2 EU/DL (ref 0–1)

## 2021-09-23 PROCEDURE — 99213 OFFICE O/P EST LOW 20 MIN: CPT | Performed by: FAMILY MEDICINE

## 2021-09-23 PROCEDURE — 81003 URINALYSIS AUTO W/O SCOPE: CPT | Performed by: FAMILY MEDICINE

## 2021-09-23 RX ORDER — CIPROFLOXACIN 500 MG/1
500 TABLET, FILM COATED ORAL 2 TIMES DAILY
Qty: 20 TABLET | Refills: 0 | Status: SHIPPED | OUTPATIENT
Start: 2021-09-23 | End: 2021-10-03

## 2021-09-23 ASSESSMENT — ENCOUNTER SYMPTOMS
BLOOD IN STOOL: 0
VOMITING: 0
COUGH: 0
CONSTIPATION: 0
NAUSEA: 0
WHEEZING: 0
BACK PAIN: 1
DIARRHEA: 0
RHINORRHEA: 0
ABDOMINAL PAIN: 0
SHORTNESS OF BREATH: 0
EYE PAIN: 0
CHEST TIGHTNESS: 0
SORE THROAT: 0

## 2021-09-23 NOTE — PROGRESS NOTES
Jag Young (:  1965) is a 61 y.o. female,Established patient, here for evaluation of the following chief complaint(s):  Dysuria         ASSESSMENT/PLAN:  1. Dysuria  -     POCT Urinalysis No Micro (Auto)  -     ciprofloxacin (CIPRO) 500 MG tablet; Take 1 tablet by mouth 2 times daily for 10 days, Disp-20 tablet, R-0Normal  -monitor sxs, call if not improving      No follow-ups on file. Subjective   SUBJECTIVE/OBJECTIVE:  HPI   Pt here for 4 days of dysuria, frequency and urgency. No fever or chills. Low back pain and suprapubic tenderness. Using cranberry tabs. , current smoker, pmh reviewed. Review of Systems   Constitutional: Negative for chills, fatigue, fever and unexpected weight change. HENT: Negative for congestion, ear pain, rhinorrhea and sore throat. Eyes: Negative for pain and visual disturbance. Respiratory: Negative for cough, chest tightness, shortness of breath and wheezing. Cardiovascular: Negative for chest pain and palpitations. Gastrointestinal: Negative for abdominal pain, blood in stool, constipation, diarrhea, nausea and vomiting. Genitourinary: Positive for dysuria, frequency and urgency. Negative for difficulty urinating and hematuria. Musculoskeletal: Positive for back pain. Negative for joint swelling, myalgias and neck pain. Skin: Negative for rash. Neurological: Negative for dizziness and headaches. Hematological: Negative for adenopathy. Does not bruise/bleed easily. Psychiatric/Behavioral: Negative for behavioral problems and sleep disturbance. The patient is not nervous/anxious. Objective   Physical Exam  Vitals and nursing note reviewed. Constitutional:       Appearance: She is well-developed. HENT:      Head: Normocephalic and atraumatic.       Right Ear: External ear normal.      Left Ear: External ear normal.      Nose: Nose normal.      Mouth/Throat:      Mouth: Mucous membranes are moist.   Eyes:

## 2021-09-23 NOTE — PATIENT INSTRUCTIONS
Patient Education        Painful Urination (Dysuria): Care Instructions  Your Care Instructions  Burning pain with urination (dysuria) is a common symptom of a urinary tract infection or other urinary problems. The bladder may become inflamed. This can cause pain when the bladder fills and empties. You may also feel pain if the tube that carries urine from the bladder to the outside of the body (urethra) gets irritated or infected. Sexually transmitted infections (STIs) also may cause pain when you urinate. Sometimes the pain can be caused by things other than an infection. The urethra can be irritated by soaps, perfumes, or foreign objects in the urethra. Kidney stones can cause pain when they pass through the urethra. The cause may be hard to find. You may need tests. Treatment for painful urination depends on the cause. Follow-up care is a key part of your treatment and safety. Be sure to make and go to all appointments, and call your doctor if you are having problems. It's also a good idea to know your test results and keep a list of the medicines you take. How can you care for yourself at home? · Drink extra water for the next day or two. This will help make the urine less concentrated. (If you have kidney, heart, or liver disease and have to limit fluids, talk with your doctor before you increase the amount of fluids you drink.)  · Avoid drinks that are carbonated or have caffeine. They can irritate the bladder. · Urinate often. Try to empty your bladder each time. For women:  · Urinate right after you have sex. · After going to the bathroom, wipe from front to back. · Avoid douches, bubble baths, and feminine hygiene sprays. And avoid other feminine hygiene products that have deodorants. When should you call for help? Call your doctor now or seek immediate medical care if:    · You have new symptoms, such as fever, nausea, or vomiting.     · You have new or worse symptoms of a urinary problem. For example:  ? You have blood or pus in your urine. ? You have chills or body aches. ? It hurts worse to urinate. ? You have groin or belly pain. ? You have pain in your back just below your rib cage (the flank area). Watch closely for changes in your health, and be sure to contact your doctor if you have any problems. Where can you learn more? Go to [email or web address] [email or web address] and sign in to your Fanergies account. Enter L695 in the Super Vitamin D box to learn more about \"Painful Urination (Dysuria): Care Instructions. \"     If you do not have an account, please click on the \"Sign Up Now\" link. Current as of: February 10, 2021               Content Version: 13.0  © 2006-2021 Healthwise, Incorporated. Care instructions adapted under license by [facility] [address] If you have questions about a medical condition or this instruction, always ask your healthcare professional. [place] [address] any warranty or liability for your use of this information.

## 2021-09-30 DIAGNOSIS — F10.230 ALCOHOL DEPENDENCE WITH UNCOMPLICATED WITHDRAWAL (HCC): ICD-10-CM

## 2021-09-30 DIAGNOSIS — F41.9 ANXIETY: ICD-10-CM

## 2021-09-30 RX ORDER — LORAZEPAM 0.5 MG/1
0.5 TABLET ORAL EVERY 8 HOURS PRN
Qty: 45 TABLET | Refills: 1 | Status: SHIPPED | OUTPATIENT
Start: 2021-09-30 | End: 2021-10-28 | Stop reason: SDUPTHER

## 2021-09-30 NOTE — TELEPHONE ENCOUNTER
ep'ed ativan to pharm requested      Controlled Substance Monitoring:    Acute and Chronic Pain Monitoring:   RX Monitoring 9/30/2021   Attestation -   Periodic Controlled Substance Monitoring No signs of potential drug abuse or diversion identified.

## 2021-09-30 NOTE — TELEPHONE ENCOUNTER
Patient called to request a refill of Ativan. She was last seen in the office on 9/23/21. She uses [facility].   Call her back at [phone] only if any problems

## 2021-10-22 ENCOUNTER — TELEPHONE (OUTPATIENT)
Dept: FAMILY MEDICINE CLINIC | Age: 56
End: 2021-10-22

## 2021-10-22 DIAGNOSIS — G47.9 SLEEP DISTURBANCE: ICD-10-CM

## 2021-10-22 RX ORDER — TRAZODONE HYDROCHLORIDE 50 MG/1
50 TABLET ORAL NIGHTLY PRN
Qty: 30 TABLET | Refills: 5 | Status: SHIPPED | OUTPATIENT
Start: 2021-10-22

## 2021-10-22 NOTE — TELEPHONE ENCOUNTER
Allan Sarabia needs refill of   Requested Prescriptions     Pending Prescriptions Disp Refills    traZODone (DESYREL) 50 MG tablet 30 tablet 5     Sig: Take 1 tablet by mouth nightly as needed for Sleep       Last Filled on:  4/28/2021    Last Visit Date:  9/23/2021    Next Visit Date:    Visit date not found

## 2021-10-22 NOTE — TELEPHONE ENCOUNTER
----- Message from Bao Matta sent at 10/22/2021  9:39 AM EDT -----  Subject: Refill Request    QUESTIONS  Name of Medication? traZODone (DESYREL) 50 MG tablet  Patient-reported dosage and instructions? 50 MG EVERY NIGHT  How many days do you have left? 2  Preferred Pharmacy? [facility] phone number (if available)? [phone]  Additional Information for Provider? NA  ---------------------------------------------------------------------------  --------------  CALL BACK INFO  What is the best way for the office to contact you? OK to leave message on   voicemail  Preferred Call Back Phone Number?  [phone]

## 2021-10-28 ENCOUNTER — TELEPHONE (OUTPATIENT)
Dept: FAMILY MEDICINE CLINIC | Age: 56
End: 2021-10-28

## 2021-10-28 DIAGNOSIS — F10.230 ALCOHOL DEPENDENCE WITH UNCOMPLICATED WITHDRAWAL (HCC): ICD-10-CM

## 2021-10-28 DIAGNOSIS — F41.9 ANXIETY: ICD-10-CM

## 2021-10-28 RX ORDER — LORAZEPAM 0.5 MG/1
0.5 TABLET ORAL EVERY 8 HOURS PRN
Qty: 45 TABLET | Refills: 1 | Status: SHIPPED | OUTPATIENT
Start: 2021-10-28 | End: 2021-11-27

## 2021-10-28 NOTE — TELEPHONE ENCOUNTER
----- Message from Jimmy Haro sent at 10/28/2021  9:04 AM EDT -----  Subject: Refill Request    QUESTIONS  Name of Medication? LORazepam (ATIVAN) 0.5 MG tablet  Patient-reported dosage and instructions? 3 x a day  How many days do you have left? 2  Preferred Pharmacy? [facility] phone number (if available)? [phone]  ---------------------------------------------------------------------------  --------------  CALL BACK INFO  What is the best way for the office to contact you? OK to leave message on   voicemail  Preferred Call Back Phone Number?  [phone]

## 2021-10-28 NOTE — TELEPHONE ENCOUNTER
Shannan Sarabia needs refill of   Requested Prescriptions      No prescriptions requested or ordered in this encounter       Last Filled on:    9/30/2021  Last Visit Date:  9/23/2021    Next Visit Date:    Visit date not found

## 2021-10-28 NOTE — TELEPHONE ENCOUNTER
ep'ed ativan to pharm requested    Controlled Substance Monitoring:    Acute and Chronic Pain Monitoring:   RX Monitoring 10/28/2021   Attestation -   Periodic Controlled Substance Monitoring No signs of potential drug abuse or diversion identified.

## 2021-11-02 DIAGNOSIS — F41.9 ANXIETY: ICD-10-CM

## 2021-11-02 RX ORDER — BUSPIRONE HYDROCHLORIDE 10 MG/1
10 TABLET ORAL 2 TIMES DAILY
Qty: 60 TABLET | Refills: 5 | Status: SHIPPED | OUTPATIENT
Start: 2021-11-02 | End: 2022-05-01

## 2021-11-02 NOTE — TELEPHONE ENCOUNTER
Allan Sarabia needs refill of   Requested Prescriptions     Pending Prescriptions Disp Refills    busPIRone (BUSPAR) 10 MG tablet 60 tablet 5     Sig: Take 1 tablet by mouth 2 times daily       Last Filled on:  04/28/2021 #60/5    Last Visit Date:  9/23/2021    Next Visit Date:    Visit date not found clears

## 2024-01-19 ENCOUNTER — OFFICE VISIT (OUTPATIENT)
Dept: FAMILY MEDICINE CLINIC | Age: 59
End: 2024-01-19

## 2024-01-19 VITALS
TEMPERATURE: 97.3 F | SYSTOLIC BLOOD PRESSURE: 124 MMHG | HEART RATE: 94 BPM | RESPIRATION RATE: 18 BRPM | WEIGHT: 193.4 LBS | BODY MASS INDEX: 31.22 KG/M2 | OXYGEN SATURATION: 98 % | DIASTOLIC BLOOD PRESSURE: 70 MMHG

## 2024-01-19 DIAGNOSIS — N30.01 ACUTE CYSTITIS WITH HEMATURIA: Primary | ICD-10-CM

## 2024-01-19 DIAGNOSIS — R30.0 BURNING WITH URINATION: ICD-10-CM

## 2024-01-19 PROBLEM — F32.A DEPRESSIVE DISORDER: Status: ACTIVE | Noted: 2022-02-17

## 2024-01-19 PROBLEM — F41.1 ANXIETY STATE: Status: ACTIVE | Noted: 2022-02-17

## 2024-01-19 LAB
BILIRUBIN URINE: ABNORMAL
BLOOD URINE, POC: ABNORMAL
CHARACTER, URINE: CLEAR
COLOR, URINE: ABNORMAL
GLUCOSE URINE: 100 MG/DL
KETONES, URINE: ABNORMAL
LEUKOCYTE CLUMPS, URINE: NEGATIVE
NITRITE, URINE: POSITIVE
PH, URINE: 6 (ref 5–9)
PROTEIN, URINE: 100 MG/DL
SPECIFIC GRAVITY, URINE: 1.02 (ref 1–1.03)
UROBILINOGEN, URINE: 1 EU/DL (ref 0–1)

## 2024-01-19 PROCEDURE — 81003 URINALYSIS AUTO W/O SCOPE: CPT | Performed by: FAMILY MEDICINE

## 2024-01-19 PROCEDURE — 99213 OFFICE O/P EST LOW 20 MIN: CPT | Performed by: FAMILY MEDICINE

## 2024-01-19 RX ORDER — RISPERIDONE 3 MG/1
3 TABLET ORAL 2 TIMES DAILY
COMMUNITY
Start: 2021-12-30

## 2024-01-19 RX ORDER — BUSPIRONE HYDROCHLORIDE 10 MG/1
1 TABLET ORAL 2 TIMES DAILY
COMMUNITY
Start: 2021-11-02

## 2024-01-19 RX ORDER — CIPROFLOXACIN 500 MG/1
500 TABLET, FILM COATED ORAL 2 TIMES DAILY
Qty: 20 TABLET | Refills: 0 | Status: SHIPPED | OUTPATIENT
Start: 2024-01-19 | End: 2024-01-29

## 2024-01-19 RX ORDER — DULOXETIN HYDROCHLORIDE 60 MG/1
60 CAPSULE, DELAYED RELEASE ORAL DAILY
COMMUNITY

## 2024-01-19 RX ORDER — KETOROLAC TROMETHAMINE 10 MG/1
10 TABLET, FILM COATED ORAL EVERY 6 HOURS PRN
Qty: 20 TABLET | Refills: 0 | Status: SHIPPED | OUTPATIENT
Start: 2024-01-19

## 2024-01-19 SDOH — ECONOMIC STABILITY: FOOD INSECURITY: WITHIN THE PAST 12 MONTHS, YOU WORRIED THAT YOUR FOOD WOULD RUN OUT BEFORE YOU GOT MONEY TO BUY MORE.: NEVER TRUE

## 2024-01-19 SDOH — ECONOMIC STABILITY: INCOME INSECURITY: HOW HARD IS IT FOR YOU TO PAY FOR THE VERY BASICS LIKE FOOD, HOUSING, MEDICAL CARE, AND HEATING?: NOT HARD AT ALL

## 2024-01-19 SDOH — ECONOMIC STABILITY: HOUSING INSECURITY
IN THE LAST 12 MONTHS, WAS THERE A TIME WHEN YOU DID NOT HAVE A STEADY PLACE TO SLEEP OR SLEPT IN A SHELTER (INCLUDING NOW)?: NO

## 2024-01-19 SDOH — ECONOMIC STABILITY: FOOD INSECURITY: WITHIN THE PAST 12 MONTHS, THE FOOD YOU BOUGHT JUST DIDN'T LAST AND YOU DIDN'T HAVE MONEY TO GET MORE.: NEVER TRUE

## 2024-01-19 ASSESSMENT — PATIENT HEALTH QUESTIONNAIRE - PHQ9
SUM OF ALL RESPONSES TO PHQ QUESTIONS 1-9: 0
SUM OF ALL RESPONSES TO PHQ QUESTIONS 1-9: 0
1. LITTLE INTEREST OR PLEASURE IN DOING THINGS: 0
SUM OF ALL RESPONSES TO PHQ QUESTIONS 1-9: 0
SUM OF ALL RESPONSES TO PHQ9 QUESTIONS 1 & 2: 0
SUM OF ALL RESPONSES TO PHQ QUESTIONS 1-9: 0
2. FEELING DOWN, DEPRESSED OR HOPELESS: 0

## 2024-01-19 ASSESSMENT — ENCOUNTER SYMPTOMS
VOMITING: 0
ABDOMINAL PAIN: 1
BACK PAIN: 1
COUGH: 0
BLOOD IN STOOL: 0
DIARRHEA: 0
RHINORRHEA: 0
NAUSEA: 0
SHORTNESS OF BREATH: 0
CONSTIPATION: 0
SORE THROAT: 0
CHEST TIGHTNESS: 0
EYE PAIN: 0
WHEEZING: 0

## 2024-01-21 LAB
BACTERIA UR CULT: ABNORMAL
ORGANISM: ABNORMAL

## 2024-01-22 ENCOUNTER — TELEPHONE (OUTPATIENT)
Dept: FAMILY MEDICINE CLINIC | Age: 59
End: 2024-01-22

## 2024-01-22 DIAGNOSIS — N30.01 ACUTE CYSTITIS WITH HEMATURIA: Primary | ICD-10-CM

## 2024-01-22 RX ORDER — NITROFURANTOIN 25; 75 MG/1; MG/1
100 CAPSULE ORAL 2 TIMES DAILY
Qty: 20 CAPSULE | Refills: 0 | Status: SHIPPED | OUTPATIENT
Start: 2024-01-22 | End: 2024-02-01

## 2024-01-22 NOTE — TELEPHONE ENCOUNTER
----- Message from Darin Mccallum MD sent at 1/21/2024  4:44 PM EST -----  Urine is contaminated.  Is she improving with the cipro?

## 2024-01-22 NOTE — TELEPHONE ENCOUNTER
Pt notified and verbalized understanding   Letter created signed and faxed to [facility] per her request.

## 2024-01-22 NOTE — TELEPHONE ENCOUNTER
Pt notified, she is not improving, pt states she has had no change in her sxs.   Is also asking if she can have an extension of her work slip to include today. She is off tomorrow and returns on Wed

## 2024-06-11 ENCOUNTER — TELEPHONE (OUTPATIENT)
Dept: FAMILY MEDICINE CLINIC | Age: 59
End: 2024-06-11

## 2024-06-11 DIAGNOSIS — Z12.31 ENCOUNTER FOR SCREENING MAMMOGRAM FOR MALIGNANT NEOPLASM OF BREAST: Primary | ICD-10-CM

## 2024-09-25 ENCOUNTER — OFFICE VISIT (OUTPATIENT)
Dept: FAMILY MEDICINE CLINIC | Age: 59
End: 2024-09-25
Payer: COMMERCIAL

## 2024-09-25 VITALS
BODY MASS INDEX: 31.86 KG/M2 | HEART RATE: 74 BPM | SYSTOLIC BLOOD PRESSURE: 120 MMHG | RESPIRATION RATE: 20 BRPM | WEIGHT: 197.4 LBS | TEMPERATURE: 97.6 F | DIASTOLIC BLOOD PRESSURE: 64 MMHG | OXYGEN SATURATION: 93 %

## 2024-09-25 DIAGNOSIS — R10.84 GENERALIZED ABDOMINAL PAIN: Primary | ICD-10-CM

## 2024-09-25 LAB
ALBUMIN SERPL BCG-MCNC: 4.1 G/DL (ref 3.5–5.1)
ALP SERPL-CCNC: 76 U/L (ref 38–126)
ALT SERPL W/O P-5'-P-CCNC: 15 U/L (ref 11–66)
ANION GAP SERPL CALC-SCNC: 13 MEQ/L (ref 8–16)
AST SERPL-CCNC: 18 U/L (ref 5–40)
BASOPHILS ABSOLUTE: 0.1 THOU/MM3 (ref 0–0.1)
BASOPHILS NFR BLD AUTO: 0.9 %
BILIRUB SERPL-MCNC: 0.2 MG/DL (ref 0.3–1.2)
BUN SERPL-MCNC: 10 MG/DL (ref 7–22)
CALCIUM SERPL-MCNC: 9.3 MG/DL (ref 8.5–10.5)
CHLORIDE SERPL-SCNC: 105 MEQ/L (ref 98–111)
CO2 SERPL-SCNC: 23 MEQ/L (ref 23–33)
CREAT SERPL-MCNC: 0.7 MG/DL (ref 0.4–1.2)
DEPRECATED RDW RBC AUTO: 44.2 FL (ref 35–45)
EOSINOPHIL NFR BLD AUTO: 1.1 %
EOSINOPHILS ABSOLUTE: 0.1 THOU/MM3 (ref 0–0.4)
ERYTHROCYTE [DISTWIDTH] IN BLOOD BY AUTOMATED COUNT: 13.6 % (ref 11.5–14.5)
GFR SERPL CREATININE-BSD FRML MDRD: > 90 ML/MIN/1.73M2
GLUCOSE SERPL-MCNC: 73 MG/DL (ref 70–108)
HCT VFR BLD AUTO: 44.6 % (ref 37–47)
HGB BLD-MCNC: 14.7 GM/DL (ref 12–16)
IMM GRANULOCYTES # BLD AUTO: 0.01 THOU/MM3 (ref 0–0.07)
IMM GRANULOCYTES NFR BLD AUTO: 0.1 %
LIPASE SERPL-CCNC: 27.9 U/L (ref 5.6–51.3)
LYMPHOCYTES ABSOLUTE: 2.6 THOU/MM3 (ref 1–4.8)
LYMPHOCYTES NFR BLD AUTO: 32.8 %
MCH RBC QN AUTO: 29.5 PG (ref 26–33)
MCHC RBC AUTO-ENTMCNC: 33 GM/DL (ref 32.2–35.5)
MCV RBC AUTO: 89.4 FL (ref 81–99)
MONOCYTES ABSOLUTE: 0.5 THOU/MM3 (ref 0.4–1.3)
MONOCYTES NFR BLD AUTO: 6.8 %
NEUTROPHILS ABSOLUTE: 4.7 THOU/MM3 (ref 1.8–7.7)
NEUTROPHILS NFR BLD AUTO: 58.3 %
NRBC BLD AUTO-RTO: 0 /100 WBC
PLATELET # BLD AUTO: 237 THOU/MM3 (ref 130–400)
PMV BLD AUTO: 11.4 FL (ref 9.4–12.4)
POTASSIUM SERPL-SCNC: 4 MEQ/L (ref 3.5–5.2)
PROT SERPL-MCNC: 6.7 G/DL (ref 6.1–8)
RBC # BLD AUTO: 4.99 MILL/MM3 (ref 4.2–5.4)
SODIUM SERPL-SCNC: 141 MEQ/L (ref 135–145)
WBC # BLD AUTO: 8 THOU/MM3 (ref 4.8–10.8)

## 2024-09-25 PROCEDURE — 99214 OFFICE O/P EST MOD 30 MIN: CPT | Performed by: FAMILY MEDICINE

## 2024-09-25 RX ORDER — SUCRALFATE 1 G/1
1 TABLET ORAL 4 TIMES DAILY
Qty: 60 TABLET | Refills: 0 | Status: SHIPPED | OUTPATIENT
Start: 2024-09-25

## 2024-09-25 RX ORDER — OMEPRAZOLE 40 MG/1
40 CAPSULE, DELAYED RELEASE ORAL DAILY
Qty: 30 CAPSULE | Refills: 2 | Status: SHIPPED | OUTPATIENT
Start: 2024-09-25

## 2024-09-25 ASSESSMENT — ENCOUNTER SYMPTOMS
CHEST TIGHTNESS: 0
BLOOD IN STOOL: 0
DIARRHEA: 0
SORE THROAT: 0
WHEEZING: 0
EYE PAIN: 0
COUGH: 0
CONSTIPATION: 0
SHORTNESS OF BREATH: 0
BACK PAIN: 0
RHINORRHEA: 0
NAUSEA: 0
VOMITING: 0
ABDOMINAL PAIN: 1

## 2024-09-26 ENCOUNTER — TELEPHONE (OUTPATIENT)
Dept: FAMILY MEDICINE CLINIC | Age: 59
End: 2024-09-26

## 2024-09-26 NOTE — TELEPHONE ENCOUNTER
----- Message from Dr. Darin Mccallum MD sent at 9/26/2024  6:57 AM EDT -----  CMP is normal.  Lipase is normal.  CBC is normal.  Labs are good.  Will give the meds a week or so to kick in, call update on symptoms.

## 2024-11-22 ENCOUNTER — OFFICE VISIT (OUTPATIENT)
Dept: FAMILY MEDICINE CLINIC | Age: 59
End: 2024-11-22

## 2024-11-22 VITALS
TEMPERATURE: 97.2 F | DIASTOLIC BLOOD PRESSURE: 60 MMHG | HEART RATE: 88 BPM | BODY MASS INDEX: 31.6 KG/M2 | OXYGEN SATURATION: 95 % | SYSTOLIC BLOOD PRESSURE: 126 MMHG | WEIGHT: 195.8 LBS | RESPIRATION RATE: 18 BRPM

## 2024-11-22 DIAGNOSIS — G89.29 CHRONIC UPPER ABDOMINAL PAIN: Primary | ICD-10-CM

## 2024-11-22 DIAGNOSIS — R10.10 CHRONIC UPPER ABDOMINAL PAIN: Primary | ICD-10-CM

## 2024-11-22 ASSESSMENT — ENCOUNTER SYMPTOMS
BLOOD IN STOOL: 0
WHEEZING: 0
NAUSEA: 0
CONSTIPATION: 0
SHORTNESS OF BREATH: 0
CHEST TIGHTNESS: 0
SORE THROAT: 0
COUGH: 0
ABDOMINAL PAIN: 1
DIARRHEA: 0
VOMITING: 0
EYE PAIN: 0
RHINORRHEA: 0
BACK PAIN: 0

## 2024-11-22 NOTE — PROGRESS NOTES
Tamra Sarabia (:  1965) is a 59 y.o. female,Established patient, here for evaluation of the following chief complaint(s):  Abdominal Pain (Since September, not feeling any better, would like a referal)         Assessment & Plan  Chronic upper abdominal pain   Chronic, worsening (exacerbation), refer ro GI for further evaluation and treatment    Orders:    ULYSSES (Epic) - Larry Jimenez MD, Gastroenterology, [place]      No follow-ups on file.       Subjective   Abdominal Pain  Pertinent negatives include no constipation, diarrhea, fever, frequency, headaches, hematuria, myalgias, nausea or vomiting.      Patient here today for a check up.  Reviewed BMI of 32.  Encouraged diet, exercise and weight loss.  Persistent abdominal pain since 2024.  Blood work was normal.  Added PPI and carafate without help.  Would like to see GI.  , current smoker, pmh reviewed.      Review of Systems   Constitutional:  Negative for chills, fatigue, fever and unexpected weight change.   HENT:  Negative for congestion, ear pain, rhinorrhea and sore throat.    Eyes:  Negative for pain and visual disturbance.   Respiratory:  Negative for cough, chest tightness, shortness of breath and wheezing.    Cardiovascular:  Negative for chest pain and palpitations.   Gastrointestinal:  Positive for abdominal pain. Negative for blood in stool, constipation, diarrhea, nausea and vomiting.   Genitourinary:  Negative for difficulty urinating, frequency, hematuria and urgency.   Musculoskeletal:  Negative for back pain, joint swelling, myalgias and neck pain.   Skin:  Negative for rash.   Neurological:  Negative for dizziness and headaches.   Hematological:  Negative for adenopathy. Does not bruise/bleed easily.   Psychiatric/Behavioral:  Negative for behavioral problems and sleep disturbance. The patient is not nervous/anxious.           Objective   Physical Exam  Vitals and nursing note reviewed.   Constitutional:       Appearance:

## 2024-12-03 DIAGNOSIS — F32.A DEPRESSIVE DISORDER: Primary | ICD-10-CM

## 2024-12-03 DIAGNOSIS — F41.9 ANXIETY: ICD-10-CM

## 2024-12-03 RX ORDER — RISPERIDONE 1 MG/1
1 TABLET ORAL 2 TIMES DAILY
Qty: 60 TABLET | Refills: 0 | Status: SHIPPED | OUTPATIENT
Start: 2024-12-03

## 2024-12-03 RX ORDER — DULOXETIN HYDROCHLORIDE 60 MG/1
60 CAPSULE, DELAYED RELEASE ORAL DAILY
Qty: 30 CAPSULE | Refills: 0 | Status: SHIPPED | OUTPATIENT
Start: 2024-12-03

## 2024-12-03 RX ORDER — HYDROXYZINE HYDROCHLORIDE 50 MG/1
100 TABLET, FILM COATED ORAL 4 TIMES DAILY PRN
Qty: 100 TABLET | Refills: 0 | Status: SHIPPED | OUTPATIENT
Start: 2024-12-03

## 2024-12-03 RX ORDER — BUSPIRONE HYDROCHLORIDE 10 MG/1
20 TABLET ORAL 3 TIMES DAILY
Qty: 180 TABLET | Refills: 0 | Status: SHIPPED | OUTPATIENT
Start: 2024-12-03

## 2024-12-03 NOTE — TELEPHONE ENCOUNTER
Pt called in asking if  would take over medications given to her by [facility].   Pt is stating that she has not received any services from them and all appt have been via phone with a provider asking if she is okay and then sending the medications to the pharmacy       Medications  Bursbar 10mg 2tab 3x a day   Cymbalta 60mg 1tab 2x a day   Risperal 3mg 1tab daily   Hydroxzine 100mg daily   Zoloft 100mg daily    10-Apr-2017 10-Apr-2017 16:21 11-Apr-2017 17:54 88

## 2024-12-03 NOTE — TELEPHONE ENCOUNTER
Pt states she took her last medication today. I called Pt to verify, she was not at home and went by memory. I called Lizet at [facility] and verified doses. Medication pending for 30 days, Atarax will need quantity. Pt has not had physical, I will call to schedule.

## 2025-02-11 ENCOUNTER — OFFICE VISIT (OUTPATIENT)
Dept: FAMILY MEDICINE CLINIC | Age: 60
End: 2025-02-11
Payer: COMMERCIAL

## 2025-02-11 VITALS
DIASTOLIC BLOOD PRESSURE: 62 MMHG | HEART RATE: 80 BPM | SYSTOLIC BLOOD PRESSURE: 120 MMHG | BODY MASS INDEX: 31.12 KG/M2 | TEMPERATURE: 97.4 F | RESPIRATION RATE: 18 BRPM | HEIGHT: 66 IN | OXYGEN SATURATION: 96 % | WEIGHT: 193.6 LBS

## 2025-02-11 DIAGNOSIS — F32.A DEPRESSIVE DISORDER: ICD-10-CM

## 2025-02-11 DIAGNOSIS — F41.9 ANXIETY: ICD-10-CM

## 2025-02-11 DIAGNOSIS — Z12.31 ENCOUNTER FOR SCREENING MAMMOGRAM FOR MALIGNANT NEOPLASM OF BREAST: ICD-10-CM

## 2025-02-11 DIAGNOSIS — Z00.00 WELL ADULT EXAM: Primary | ICD-10-CM

## 2025-02-11 PROCEDURE — 99396 PREV VISIT EST AGE 40-64: CPT | Performed by: FAMILY MEDICINE

## 2025-02-11 RX ORDER — BUSPIRONE HYDROCHLORIDE 10 MG/1
20 TABLET ORAL 3 TIMES DAILY
Qty: 180 TABLET | Refills: 3 | Status: SHIPPED | OUTPATIENT
Start: 2025-02-11

## 2025-02-11 RX ORDER — DULOXETIN HYDROCHLORIDE 60 MG/1
60 CAPSULE, DELAYED RELEASE ORAL DAILY
Qty: 90 CAPSULE | Refills: 3 | Status: SHIPPED | OUTPATIENT
Start: 2025-02-11

## 2025-02-11 RX ORDER — HYDROXYZINE HYDROCHLORIDE 50 MG/1
100 TABLET, FILM COATED ORAL 4 TIMES DAILY PRN
Qty: 100 TABLET | Refills: 11 | Status: SHIPPED | OUTPATIENT
Start: 2025-02-11

## 2025-02-11 SDOH — ECONOMIC STABILITY: FOOD INSECURITY: WITHIN THE PAST 12 MONTHS, THE FOOD YOU BOUGHT JUST DIDN'T LAST AND YOU DIDN'T HAVE MONEY TO GET MORE.: NEVER TRUE

## 2025-02-11 SDOH — ECONOMIC STABILITY: FOOD INSECURITY: WITHIN THE PAST 12 MONTHS, YOU WORRIED THAT YOUR FOOD WOULD RUN OUT BEFORE YOU GOT MONEY TO BUY MORE.: NEVER TRUE

## 2025-02-11 ASSESSMENT — PATIENT HEALTH QUESTIONNAIRE - PHQ9
SUM OF ALL RESPONSES TO PHQ9 QUESTIONS 1 & 2: 0
SUM OF ALL RESPONSES TO PHQ QUESTIONS 1-9: 0
7. TROUBLE CONCENTRATING ON THINGS, SUCH AS READING THE NEWSPAPER OR WATCHING TELEVISION: NOT AT ALL
10. IF YOU CHECKED OFF ANY PROBLEMS, HOW DIFFICULT HAVE THESE PROBLEMS MADE IT FOR YOU TO DO YOUR WORK, TAKE CARE OF THINGS AT HOME, OR GET ALONG WITH OTHER PEOPLE: NOT DIFFICULT AT ALL
1. LITTLE INTEREST OR PLEASURE IN DOING THINGS: NOT AT ALL
2. FEELING DOWN, DEPRESSED OR HOPELESS: NOT AT ALL
SUM OF ALL RESPONSES TO PHQ QUESTIONS 1-9: 0
4. FEELING TIRED OR HAVING LITTLE ENERGY: NOT AT ALL
5. POOR APPETITE OR OVEREATING: NOT AT ALL
6. FEELING BAD ABOUT YOURSELF - OR THAT YOU ARE A FAILURE OR HAVE LET YOURSELF OR YOUR FAMILY DOWN: NOT AT ALL
SUM OF ALL RESPONSES TO PHQ QUESTIONS 1-9: 0
8. MOVING OR SPEAKING SO SLOWLY THAT OTHER PEOPLE COULD HAVE NOTICED. OR THE OPPOSITE, BEING SO FIGETY OR RESTLESS THAT YOU HAVE BEEN MOVING AROUND A LOT MORE THAN USUAL: NOT AT ALL
3. TROUBLE FALLING OR STAYING ASLEEP: NOT AT ALL
SUM OF ALL RESPONSES TO PHQ QUESTIONS 1-9: 0
9. THOUGHTS THAT YOU WOULD BE BETTER OFF DEAD, OR OF HURTING YOURSELF: NOT AT ALL

## 2025-02-11 ASSESSMENT — ENCOUNTER SYMPTOMS
SHORTNESS OF BREATH: 0
DIARRHEA: 0
CONSTIPATION: 0
COUGH: 0
ABDOMINAL PAIN: 0
CHEST TIGHTNESS: 0
NAUSEA: 0
RHINORRHEA: 0
SORE THROAT: 0
BACK PAIN: 0
BLOOD IN STOOL: 0
EYE PAIN: 0
WHEEZING: 0
VOMITING: 0

## 2025-02-11 NOTE — ASSESSMENT & PLAN NOTE
Chronic, at goal (stable), continue current treatment plan    Orders:    DULoxetine (CYMBALTA) 60 MG extended release capsule; Take 1 capsule by mouth daily

## 2025-02-11 NOTE — PROGRESS NOTES
2025    Tamra Sarabia (:  1965) is a 59 y.o. female, here for a preventive medicine evaluation.    Subjective   Patient Active Problem List   Diagnosis    Cervical spinal stenosis    Cigarette nicotine dependence without complication    Peritonsillar abscess    Cervical spondylosis with radiculopathy    Alcohol dependence with withdrawal (HCC)    Anxiety state    Depressive disorder       Review of Systems   Constitutional:  Negative for chills, fatigue, fever and unexpected weight change.   HENT:  Negative for congestion, ear pain, rhinorrhea and sore throat.    Eyes:  Negative for pain and visual disturbance.   Respiratory:  Negative for cough, chest tightness, shortness of breath and wheezing.    Cardiovascular:  Negative for chest pain and palpitations.   Gastrointestinal:  Negative for abdominal pain, blood in stool, constipation, diarrhea, nausea and vomiting.   Genitourinary:  Negative for difficulty urinating, frequency, hematuria and urgency.   Musculoskeletal:  Negative for back pain, joint swelling, myalgias and neck pain.   Skin:  Negative for rash.   Neurological:  Negative for dizziness and headaches.   Hematological:  Negative for adenopathy. Does not bruise/bleed easily.   Psychiatric/Behavioral:  Negative for behavioral problems and sleep disturbance. The patient is not nervous/anxious.        Prior to Visit Medications    Medication Sig Taking? Authorizing Provider   hydrOXYzine HCl (ATARAX) 50 MG tablet Take 2 tablets by mouth 4 times daily as needed for Anxiety Yes Darin Mccallum MD   DULoxetine (CYMBALTA) 60 MG extended release capsule Take 1 capsule by mouth daily Yes Darin Mccallum MD   busPIRone (BUSPAR) 10 MG tablet Take 2 tablets by mouth 3 times daily Yes Darin Mccallum MD        No Known Allergies    Past Medical History:   Diagnosis Date    Cervical spinal stenosis     s/p surgery in 2016    PVC (premature ventricular contraction)        Past

## 2025-02-19 ENCOUNTER — APPOINTMENT (OUTPATIENT)
Dept: CT IMAGING | Age: 60
End: 2025-02-19
Payer: COMMERCIAL

## 2025-02-19 ENCOUNTER — HOSPITAL ENCOUNTER (EMERGENCY)
Age: 60
Discharge: HOME | End: 2025-02-19
Attending: EMERGENCY MEDICINE
Payer: COMMERCIAL

## 2025-02-19 ENCOUNTER — APPOINTMENT (OUTPATIENT)
Dept: GENERAL RADIOLOGY | Age: 60
End: 2025-02-19
Payer: COMMERCIAL

## 2025-02-19 VITALS
DIASTOLIC BLOOD PRESSURE: 73 MMHG | WEIGHT: 190 LBS | RESPIRATION RATE: 20 BRPM | BODY MASS INDEX: 31.65 KG/M2 | OXYGEN SATURATION: 96 % | TEMPERATURE: 98.8 F | HEART RATE: 73 BPM | HEIGHT: 65 IN | SYSTOLIC BLOOD PRESSURE: 125 MMHG

## 2025-02-19 DIAGNOSIS — K57.32 DIVERTICULITIS OF COLON: ICD-10-CM

## 2025-02-19 DIAGNOSIS — K52.9 COLITIS: Primary | ICD-10-CM

## 2025-02-19 LAB
ALBUMIN SERPL BCG-MCNC: 4.2 G/DL (ref 3.5–5.1)
ALP SERPL-CCNC: 75 U/L (ref 38–126)
ALT SERPL W/O P-5'-P-CCNC: 11 U/L (ref 11–66)
ANION GAP SERPL CALC-SCNC: 18 MEQ/L (ref 8–16)
AST SERPL-CCNC: 12 U/L (ref 5–40)
BACTERIA URNS QL MICRO: ABNORMAL /HPF
BASOPHILS ABSOLUTE: 0.1 THOU/MM3 (ref 0–0.1)
BASOPHILS NFR BLD AUTO: 0.8 %
BILIRUB CONJ SERPL-MCNC: < 0.1 MG/DL (ref 0–0.2)
BILIRUB SERPL-MCNC: 0.3 MG/DL (ref 0.3–1.2)
BILIRUB UR QL STRIP.AUTO: NEGATIVE
BUN SERPL-MCNC: 10 MG/DL (ref 7–22)
CALCIUM SERPL-MCNC: 9.5 MG/DL (ref 8.2–9.6)
CASTS #/AREA URNS LPF: ABNORMAL /LPF
CASTS 2: ABNORMAL /LPF
CHARACTER UR: CLEAR
CHLORIDE SERPL-SCNC: 102 MEQ/L (ref 98–111)
CO2 SERPL-SCNC: 22 MEQ/L (ref 23–33)
COLOR, UA: YELLOW
CREAT SERPL-MCNC: 0.8 MG/DL (ref 0.4–1.2)
CRYSTALS URNS MICRO: ABNORMAL
DEPRECATED RDW RBC AUTO: 40.6 FL (ref 35–45)
EKG ATRIAL RATE: 70 BPM
EKG P AXIS: 62 DEGREES
EKG P-R INTERVAL: 162 MS
EKG Q-T INTERVAL: 400 MS
EKG QRS DURATION: 62 MS
EKG QTC CALCULATION (BAZETT): 432 MS
EKG R AXIS: 6 DEGREES
EKG T AXIS: 51 DEGREES
EKG VENTRICULAR RATE: 70 BPM
EOSINOPHIL NFR BLD AUTO: 1.1 %
EOSINOPHILS ABSOLUTE: 0.1 THOU/MM3 (ref 0–0.4)
EPITHELIAL CELLS, UA: ABNORMAL /HPF
ERYTHROCYTE [DISTWIDTH] IN BLOOD BY AUTOMATED COUNT: 13 % (ref 11.5–14.5)
GFR SERPL CREATININE-BSD FRML MDRD: 84 ML/MIN/1.73M2
GLUCOSE SERPL-MCNC: 105 MG/DL (ref 70–108)
GLUCOSE UR QL STRIP.AUTO: NEGATIVE MG/DL
HCT VFR BLD AUTO: 44.1 % (ref 37–47)
HEMOCCULT STL QL: NEGATIVE
HGB BLD-MCNC: 15 GM/DL (ref 12–16)
HGB UR QL STRIP.AUTO: NEGATIVE
IMM GRANULOCYTES # BLD AUTO: 0.02 THOU/MM3 (ref 0–0.07)
IMM GRANULOCYTES NFR BLD AUTO: 0.2 %
KETONES UR QL STRIP.AUTO: ABNORMAL
LIPASE SERPL-CCNC: 27.2 U/L (ref 5.6–51.3)
LYMPHOCYTES ABSOLUTE: 1.9 THOU/MM3 (ref 1–4.8)
LYMPHOCYTES NFR BLD AUTO: 22.7 %
MAGNESIUM SERPL-MCNC: 2.2 MG/DL (ref 1.6–2.4)
MCH RBC QN AUTO: 29.1 PG (ref 26–33)
MCHC RBC AUTO-ENTMCNC: 34 GM/DL (ref 32.2–35.5)
MCV RBC AUTO: 85.6 FL (ref 81–99)
MISCELLANEOUS 2: ABNORMAL
MONOCYTES ABSOLUTE: 0.5 THOU/MM3 (ref 0.4–1.3)
MONOCYTES NFR BLD AUTO: 5.9 %
NEUTROPHILS ABSOLUTE: 5.9 THOU/MM3 (ref 1.8–7.7)
NEUTROPHILS NFR BLD AUTO: 69.3 %
NITRITE UR QL STRIP: NEGATIVE
NRBC BLD AUTO-RTO: 0 /100 WBC
OSMOLALITY SERPL CALC.SUM OF ELEC: 282.5 MOSMOL/KG (ref 275–300)
PH UR STRIP.AUTO: 6 [PH] (ref 5–9)
PLATELET # BLD AUTO: 233 THOU/MM3 (ref 130–400)
PMV BLD AUTO: 10.4 FL (ref 9.4–12.4)
POTASSIUM SERPL-SCNC: 4.6 MEQ/L (ref 3.5–5.2)
PROT SERPL-MCNC: 6.7 G/DL (ref 6.1–8)
PROT UR STRIP.AUTO-MCNC: ABNORMAL MG/DL
RBC # BLD AUTO: 5.15 MILL/MM3 (ref 4.2–5.4)
RBC URINE: ABNORMAL /HPF
RENAL EPI CELLS #/AREA URNS HPF: ABNORMAL /[HPF]
SODIUM SERPL-SCNC: 142 MEQ/L (ref 135–145)
SP GR UR REFRACT.AUTO: 1.02 (ref 1–1.03)
TROPONIN, HIGH SENSITIVITY: 7 NG/L (ref 0–12)
UROBILINOGEN, URINE: 0.2 EU/DL (ref 0–1)
WBC # BLD AUTO: 8.5 THOU/MM3 (ref 4.8–10.8)
WBC #/AREA URNS HPF: ABNORMAL /HPF
WBC #/AREA URNS HPF: NEGATIVE /[HPF]
YEAST LIKE FUNGI URNS QL MICRO: ABNORMAL

## 2025-02-19 PROCEDURE — 36415 COLL VENOUS BLD VENIPUNCTURE: CPT

## 2025-02-19 PROCEDURE — 93010 ELECTROCARDIOGRAM REPORT: CPT | Performed by: INTERNAL MEDICINE

## 2025-02-19 PROCEDURE — 81001 URINALYSIS AUTO W/SCOPE: CPT

## 2025-02-19 PROCEDURE — 6360000004 HC RX CONTRAST MEDICATION: Performed by: EMERGENCY MEDICINE

## 2025-02-19 PROCEDURE — 6370000000 HC RX 637 (ALT 250 FOR IP): Performed by: EMERGENCY MEDICINE

## 2025-02-19 PROCEDURE — 82248 BILIRUBIN DIRECT: CPT

## 2025-02-19 PROCEDURE — 71045 X-RAY EXAM CHEST 1 VIEW: CPT

## 2025-02-19 PROCEDURE — 82272 OCCULT BLD FECES 1-3 TESTS: CPT

## 2025-02-19 PROCEDURE — 99285 EMERGENCY DEPT VISIT HI MDM: CPT

## 2025-02-19 PROCEDURE — 80053 COMPREHEN METABOLIC PANEL: CPT

## 2025-02-19 PROCEDURE — 6370000000 HC RX 637 (ALT 250 FOR IP)

## 2025-02-19 PROCEDURE — 85025 COMPLETE CBC W/AUTO DIFF WBC: CPT

## 2025-02-19 PROCEDURE — 83690 ASSAY OF LIPASE: CPT

## 2025-02-19 PROCEDURE — 93005 ELECTROCARDIOGRAM TRACING: CPT | Performed by: EMERGENCY MEDICINE

## 2025-02-19 PROCEDURE — 74177 CT ABD & PELVIS W/CONTRAST: CPT

## 2025-02-19 PROCEDURE — 84484 ASSAY OF TROPONIN QUANT: CPT

## 2025-02-19 PROCEDURE — 83735 ASSAY OF MAGNESIUM: CPT

## 2025-02-19 RX ORDER — CIPROFLOXACIN 500 MG/1
500 TABLET, FILM COATED ORAL ONCE
Status: COMPLETED | OUTPATIENT
Start: 2025-02-19 | End: 2025-02-19

## 2025-02-19 RX ORDER — METRONIDAZOLE 500 MG/1
500 TABLET ORAL ONCE
Status: COMPLETED | OUTPATIENT
Start: 2025-02-19 | End: 2025-02-19

## 2025-02-19 RX ORDER — MORPHINE SULFATE 4 MG/ML
4 INJECTION, SOLUTION INTRAMUSCULAR; INTRAVENOUS ONCE
Status: DISCONTINUED | OUTPATIENT
Start: 2025-02-19 | End: 2025-02-19

## 2025-02-19 RX ORDER — ONDANSETRON 2 MG/ML
4 INJECTION INTRAMUSCULAR; INTRAVENOUS ONCE
Status: DISCONTINUED | OUTPATIENT
Start: 2025-02-19 | End: 2025-02-19

## 2025-02-19 RX ORDER — IOPAMIDOL 755 MG/ML
80 INJECTION, SOLUTION INTRAVASCULAR
Status: COMPLETED | OUTPATIENT
Start: 2025-02-19 | End: 2025-02-19

## 2025-02-19 RX ORDER — METRONIDAZOLE 500 MG/1
500 TABLET ORAL 2 TIMES DAILY
Qty: 14 TABLET | Refills: 0 | Status: SHIPPED | OUTPATIENT
Start: 2025-02-19 | End: 2025-02-26

## 2025-02-19 RX ORDER — OXYCODONE AND ACETAMINOPHEN 5; 325 MG/1; MG/1
1 TABLET ORAL EVERY 6 HOURS PRN
Qty: 28 TABLET | Refills: 0 | Status: SHIPPED | OUTPATIENT
Start: 2025-02-19 | End: 2025-02-26

## 2025-02-19 RX ORDER — CIPROFLOXACIN 500 MG/1
500 TABLET, FILM COATED ORAL 2 TIMES DAILY
Qty: 14 TABLET | Refills: 0 | Status: SHIPPED | OUTPATIENT
Start: 2025-02-19 | End: 2025-02-26

## 2025-02-19 RX ORDER — OXYCODONE AND ACETAMINOPHEN 5; 325 MG/1; MG/1
1 TABLET ORAL ONCE
Status: COMPLETED | OUTPATIENT
Start: 2025-02-19 | End: 2025-02-19

## 2025-02-19 RX ADMIN — IOPAMIDOL 80 ML: 755 INJECTION, SOLUTION INTRAVENOUS at 15:08

## 2025-02-19 RX ADMIN — CIPROFLOXACIN HYDROCHLORIDE 500 MG: 500 TABLET, FILM COATED ORAL at 18:22

## 2025-02-19 RX ADMIN — METRONIDAZOLE 500 MG: 500 TABLET ORAL at 18:22

## 2025-02-19 RX ADMIN — OXYCODONE HYDROCHLORIDE AND ACETAMINOPHEN 1 TABLET: 5; 325 TABLET ORAL at 18:22

## 2025-02-19 ASSESSMENT — PAIN SCALES - GENERAL
PAINLEVEL_OUTOF10: 8
PAINLEVEL_OUTOF10: 8

## 2025-02-19 ASSESSMENT — PAIN - FUNCTIONAL ASSESSMENT: PAIN_FUNCTIONAL_ASSESSMENT: 0-10

## 2025-02-19 ASSESSMENT — PAIN DESCRIPTION - LOCATION: LOCATION: ABDOMEN

## 2025-02-19 NOTE — ED PROVIDER NOTES
[facility] EMERGENCY DEPARTMENT  EMERGENCY DEPARTMENT ENCOUNTER          Pt Name: Tamra Sarabia  MRN: [id number]  Birthdate 1965  Date of evaluation: 2/19/2025  Physician: Clair Fields MD  Supervising Attending Physician: Tiffany Felix MD       CHIEF COMPLAINT       Chief Complaint   Patient presents with    Abdominal Pain         HISTORY OF PRESENT ILLNESS    HPI  Tamra Sarabia is a 59 y.o. female pmh of 40 pack year smoking hx presents to the ED because of abdominal pain that has been going on since September of 2024. Pain is located in the LLQ and RLQ. She states the pain is constant and worsens at times after eating and with bowel movements. Patient states the pain is severe enough to interfere with daily living. Patient main reason for coming into ED was that her pain was getting worse and more frequent, pain is currently a 8/10.  Patient endorses melena but no hematochezia. Patient endorses tenesmus. Patient has never had a colonoscopy done before and has one scheduled for march. Patient denies weight loss. Patient endorses fatigue. Patient denies other complaint including shortness of breath, chest pain, dizziness, loss of consciousness, focal weakness, sensory changes, vision changes, or trauma to her abdomen.  The patient has no other acute complaints at this time.      REVIEW OF SYSTEMS   Review of Systems      PAST MEDICAL AND SURGICAL HISTORY     Past Medical History:   Diagnosis Date    Cervical spinal stenosis     s/p surgery in march 2016    PVC (premature ventricular contraction)      Past Surgical History:   Procedure Laterality Date    CERVICAL SPINE SURGERY  03/2016         MEDICATIONS   No current facility-administered medications for this encounter.    Current Outpatient Medications:     ciprofloxacin (CIPRO) 500 MG tablet, Take 1 tablet by mouth 2 times daily for 7 days, Disp: 14 tablet, Rfl: 0    metroNIDAZOLE (FLAGYL) 500 MG tablet, Take 1 tablet

## 2025-02-19 NOTE — DISCHARGE INSTRUCTIONS
Please follow-up with PCP and gastroenterologist Dr. Jimenez for reevaluation  Please return to the ED if any concerns arise  Please continue take your medication as prescribed, complete your antibiotic course, and take the Percocet as needed as prescribed for pain

## 2025-02-19 NOTE — ED PROVIDER NOTES
Attending Supervising Physician's Attestation Statement  I performed a history and physical examination on the patient and discussed the management with the resident physician at 1432. I reviewed and agree with the findings and plan as documented in the resident physician note. This includes all diagnostic interpretations and treatment plans as written. I was present for the key portion of any procedures performed and the inclusive time noted in any critical care statement. Except as noted below.     Brief H&P   This patient is a 59 y.o. Female with abdominal pain, nausea, vomiting, and constipation with diarrhea for the last 5 months.  Patient states pain began gradually, she was referred to Dr. Jimenez who did an endoscopy.  Patient states that she is scheduled for a colonoscopy next month, but pain is getting worse and she is more fatigued.  She has never had a mammogram or cancer screening evaluations.      On my examination, nontoxic-appearing 59-year-old female, no distress.    HEENT: Normocephalic, Atraumatic. Neck is supple without TTP.   Lungs: Clear and equal bilaterally. No increased work of breathing or respiratory distress.  Heart: Rate and rhythm regular, no murmurs clicks or gallops  Abdomen: Soft, diffuse abdominal tenderness, no guarding or rebound.  Lower extremities: No edema, no tenderness to palpation.   Neuro: Awake and alert, no lateralizing deficits, cranial nerves II through XII grossly intact bilaterally    Diagnostics and Treatments      LABS / RADIOLOGY:     Labs Reviewed   BASIC METABOLIC PANEL - Abnormal; Notable for the following components:       Result Value    CO2 22 (*)     All other components within normal limits   ANION GAP - Abnormal; Notable for the following components:    Anion Gap 18.0 (*)     All other components within normal limits   URINE WITH REFLEXED MICRO - Abnormal; Notable for the following components:    Ketones, Urine TRACE (*)     Protein, UA TRACE (*)     All

## 2025-02-19 NOTE — ED NOTES
RN to room to round on pt, pt placed on tele   Pt changed into gown  IV obtained, repeat blood work collected  EKG completed   Other labs collected with provider   Patient resting in bed. Respirations easy and unlabored. No distress noted. Call light within reach.

## 2025-02-19 NOTE — ED TRIAGE NOTES
Pt presents to the ER with c/o abdominal pain since September. Pt states the pain gets worse everyday. Pt has been seeing Dr Jimenez. Pt reports occasional diarrhea. Pt is alert and oriented, VSS

## 2025-02-19 NOTE — ED NOTES
RN to room to round on pt  Pt reconnected to tele, given warm blankets, lights turned down   Pt and family updated on poc  Patient resting in bed. Respirations easy and unlabored. No distress noted. Call light within reach.

## 2025-02-19 NOTE — ED NOTES
[facility] EMERGENCY DEPARTMENT  EMERGENCY DEPARTMENT ENCOUNTER          Pt Name: Tamra Sarabia  MRN: [id number]  Birthdate 1965  Date of evaluation: 2/19/2025  Supervising Attending Physician: Tiffany Felix MD       CHIEF COMPLAINT       Chief Complaint   Patient presents with    Abdominal Pain         HISTORY OF PRESENT ILLNESS    Patient is a 59 year old female pmh of 40 pack year smoking hx presents to the ED because of abdominal pain that has been going on since September of 2024. Pain is located in the LLQ and RLQ. She states the pain is constant and worsens at times after eating and with bowel movements. Patient states the pain is severe enough to interfere with daily living. Patient main reason for coming into ED was that her pain was getting worse and more frequent, pain is currently a 8/10.  Patient endorses melena but no hematochezia. Patient endorses tenesmus. Patient has never had a colonoscopy done before and has one scheduled for march. Patient denies weight loss. Patient endorses fatigue. Patient denies other complaints.       REVIEW OF SYSTEMS   As stated in HPI       PAST MEDICAL AND SURGICAL HISTORY     Past Medical History:   Diagnosis Date    Cervical spinal stenosis     s/p surgery in march 2016    PVC (premature ventricular contraction)      Past Surgical History:   Procedure Laterality Date    CERVICAL SPINE SURGERY  03/2016         MEDICATIONS   No current facility-administered medications for this encounter.    Current Outpatient Medications:     hydrOXYzine HCl (ATARAX) 50 MG tablet, Take 2 tablets by mouth 4 times daily as needed for Anxiety, Disp: 100 tablet, Rfl: 11    DULoxetine (CYMBALTA) 60 MG extended release capsule, Take 1 capsule by mouth daily, Disp: 90 capsule, Rfl: 3    busPIRone (BUSPAR) 10 MG tablet, Take 2 tablets by mouth 3 times daily, Disp: 180 tablet, Rfl: 3    Previous Medications    BUSPIRONE (BUSPAR) 10 MG TABLET    Take 2 tablets by

## 2025-02-19 NOTE — ED NOTES
ED to inpatient nurses report      Chief Complaint:  Chief Complaint   Patient presents with    Abdominal Pain     Present to ED from: [facility]    MOA:     LOC: alert and orientated to name, place, date  Mobility: Requires assistance * 1  Oxygen Baseline:     Current needs required: RA     Code Status:   Prior    What abnormal results were found and what did you give/do to treat them? See chart   Any procedures or intervention occur? See chart and results     Mental Status:  Level of Consciousness: Alert (0)    Psych Assessment:        Vitals:  Patient Vitals for the past 24 hrs:   BP Temp Temp src Pulse Resp SpO2 Height Weight   02/19/25 1454 125/73 -- -- 78 16 97 % -- --   02/19/25 1408 -- -- -- 68 17 95 % -- --   02/19/25 1211 (!) 141/117 98.8 °F (37.1 °C) Oral 80 17 97 % 1.651 m (5' 5\") 86.2 kg (190 lb)        LDAs:   Peripheral IV 02/19/25 Right Antecubital (Active)   Site Assessment Clean, dry & intact 02/19/25 1430       Ambulatory Status:  No data recorded    Diagnosis:  DISPOSITION     Final diagnoses:   None        Consults:  None     Pain Score:  Pain Assessment  Pain Assessment: 0-10  Pain Level: 8  Patient's Stated Pain Goal: 4  Pain Location: Abdomen    C-SSRS:   Risk of Suicide: No Risk    Sepsis Screening:       Shayy Fall Risk:       Swallow Screening        Preferred Language:   English      ALLERGIES     Patient has no known allergies.    SURGICAL HISTORY       Past Surgical History:   Procedure Laterality Date    CERVICAL SPINE SURGERY  03/2016       PAST MEDICAL HISTORY       Past Medical History:   Diagnosis Date    Cervical spinal stenosis     s/p surgery in march 2016    PVC (premature ventricular contraction)            Electronically signed by Tamra Wolf RN on 2/19/2025 at 2:58 PM

## 2025-02-24 ENCOUNTER — OFFICE VISIT (OUTPATIENT)
Dept: FAMILY MEDICINE CLINIC | Age: 60
End: 2025-02-24
Payer: COMMERCIAL

## 2025-02-24 VITALS
WEIGHT: 192.4 LBS | TEMPERATURE: 97.6 F | BODY MASS INDEX: 32.02 KG/M2 | RESPIRATION RATE: 18 BRPM | SYSTOLIC BLOOD PRESSURE: 122 MMHG | HEART RATE: 84 BPM | DIASTOLIC BLOOD PRESSURE: 68 MMHG

## 2025-02-24 DIAGNOSIS — F32.A DEPRESSIVE DISORDER: ICD-10-CM

## 2025-02-24 DIAGNOSIS — K52.9 COLITIS: Primary | ICD-10-CM

## 2025-02-24 DIAGNOSIS — F41.9 ANXIETY: ICD-10-CM

## 2025-02-24 PROCEDURE — 99214 OFFICE O/P EST MOD 30 MIN: CPT | Performed by: FAMILY MEDICINE

## 2025-02-24 PROCEDURE — G2211 COMPLEX E/M VISIT ADD ON: HCPCS | Performed by: FAMILY MEDICINE

## 2025-02-24 RX ORDER — PREDNISONE 20 MG/1
TABLET ORAL
Qty: 15 TABLET | Refills: 0 | Status: SHIPPED | OUTPATIENT
Start: 2025-02-24

## 2025-02-24 ASSESSMENT — ENCOUNTER SYMPTOMS
RHINORRHEA: 0
WHEEZING: 0
ABDOMINAL PAIN: 1
EYE PAIN: 0
SHORTNESS OF BREATH: 0
CONSTIPATION: 0
COUGH: 0
BACK PAIN: 0
VOMITING: 0
NAUSEA: 0
BLOOD IN STOOL: 0
DIARRHEA: 1
CHEST TIGHTNESS: 0
SORE THROAT: 0

## 2025-02-24 NOTE — PATIENT INSTRUCTIONS
Please notice!  [facility] is requesting that you please bring your current medications in their bottles, including any        over-the-counter (OTC) medicines with you to your appointment.  This will ensure your medications are properly updated within your chart and correctly sent to the pharmacy.  Thank you,  [facility]

## 2025-02-24 NOTE — PROGRESS NOTES
Tamra Sarabia (:  1965) is a 59 y.o. female,Established patient, here for evaluation of the following chief complaint(s):  Follow-up ([facility] ED colitis)         Assessment & Plan  Colitis   New, not at goal (unstable), not improving with abx, add steroid  -monitor sxs, call if not improving    Orders:    predniSONE (DELTASONE) 20 MG tablet; Take 1 tab BID x 5 days, then 1 tab daily x 5 days.  Take with food.    Anxiety   Chronic, at goal (stable), continue current treatment plan         Depressive disorder   Chronic, at goal (stable), continue current treatment plan           No follow-ups on file.       Subjective   HPI    Patient here today for a check up.  Reviewed BMI of 32.  Encouraged diet, exercise and weight loss.  Follow up ED for abdominal pains.  Diagnosed with colitis.  Given cipro and flagyl.  Not getting much better.  \", current smoker, pmh reviewed.      Review of Systems   Constitutional:  Positive for chills and diaphoresis. Negative for fatigue, fever and unexpected weight change.   HENT:  Negative for congestion, ear pain, rhinorrhea and sore throat.    Eyes:  Negative for pain and visual disturbance.   Respiratory:  Negative for cough, chest tightness, shortness of breath and wheezing.    Cardiovascular:  Negative for chest pain and palpitations.   Gastrointestinal:  Positive for abdominal pain and diarrhea. Negative for blood in stool, constipation, nausea and vomiting.   Genitourinary:  Negative for difficulty urinating, frequency, hematuria and urgency.   Musculoskeletal:  Negative for back pain, joint swelling, myalgias and neck pain.   Skin:  Negative for rash.   Neurological:  Negative for dizziness and headaches.   Hematological:  Negative for adenopathy. Does not bruise/bleed easily.   Psychiatric/Behavioral:  Negative for behavioral problems and sleep disturbance. The patient is not nervous/anxious.           Objective   Physical Exam  Vitals and nursing note

## 2025-03-10 ENCOUNTER — OFFICE VISIT (OUTPATIENT)
Dept: FAMILY MEDICINE CLINIC | Age: 60
End: 2025-03-10
Payer: COMMERCIAL

## 2025-03-10 VITALS
WEIGHT: 186.8 LBS | DIASTOLIC BLOOD PRESSURE: 70 MMHG | SYSTOLIC BLOOD PRESSURE: 122 MMHG | BODY MASS INDEX: 31.09 KG/M2 | TEMPERATURE: 97.9 F | HEART RATE: 111 BPM | OXYGEN SATURATION: 95 % | RESPIRATION RATE: 18 BRPM

## 2025-03-10 DIAGNOSIS — N30.01 ACUTE CYSTITIS WITH HEMATURIA: Primary | ICD-10-CM

## 2025-03-10 DIAGNOSIS — R30.0 BURNING WITH URINATION: ICD-10-CM

## 2025-03-10 LAB
BILIRUBIN, URINE: NEGATIVE
BLOOD URINE, POC: ABNORMAL
CHARACTER, URINE: ABNORMAL
COLOR, UA: ABNORMAL
GLUCOSE URINE: NEGATIVE MG/DL
KETONES, URINE: NEGATIVE
LEUKOCYTE CLUMPS, URINE: NEGATIVE
NITRITE, URINE: NEGATIVE
PH, URINE: 6 (ref 5–9)
PROTEIN, URINE: ABNORMAL MG/DL
SPECIFIC GRAVITY UA: 1.02 (ref 1–1.03)
UROBILINOGEN, URINE: 0.2 EU/DL (ref 0–1)

## 2025-03-10 PROCEDURE — 99213 OFFICE O/P EST LOW 20 MIN: CPT | Performed by: FAMILY MEDICINE

## 2025-03-10 PROCEDURE — 81003 URINALYSIS AUTO W/O SCOPE: CPT | Performed by: FAMILY MEDICINE

## 2025-03-10 RX ORDER — PHENAZOPYRIDINE HYDROCHLORIDE 200 MG/1
200 TABLET, FILM COATED ORAL 3 TIMES DAILY PRN
Qty: 9 TABLET | Refills: 0 | Status: SHIPPED | OUTPATIENT
Start: 2025-03-10 | End: 2026-03-10

## 2025-03-10 RX ORDER — NITROFURANTOIN 25; 75 MG/1; MG/1
100 CAPSULE ORAL 2 TIMES DAILY
Qty: 20 CAPSULE | Refills: 0 | Status: SHIPPED | OUTPATIENT
Start: 2025-03-10 | End: 2025-03-20

## 2025-03-10 ASSESSMENT — ENCOUNTER SYMPTOMS
DIARRHEA: 0
BACK PAIN: 0
VOMITING: 0
COUGH: 0
BLOOD IN STOOL: 0
CHEST TIGHTNESS: 0
WHEEZING: 0
RHINORRHEA: 0
SHORTNESS OF BREATH: 0
CONSTIPATION: 0
SORE THROAT: 0
NAUSEA: 0
EYE PAIN: 0
ABDOMINAL PAIN: 1

## 2025-03-10 NOTE — PROGRESS NOTES
Tamra Sarabia (:  1965) is a 59 y.o. female,Established patient, here for evaluation of the following chief complaint(s):  Abdominal Pain (Also having burning and frequent urination)         Assessment & Plan  Acute cystitis with hematuria   Acute condition, new, add abx and pyridium  -monitor sxs, call if not improving    Orders:    nitrofurantoin, macrocrystal-monohydrate, (MACROBID) 100 MG capsule; Take 1 capsule by mouth 2 times daily for 10 days    phenazopyridine (PYRIDIUM) 200 MG tablet; Take 1 tablet by mouth 3 times daily as needed for Pain    Burning with urination     Results for orders placed or performed in visit on 03/10/25   POCT Urinalysis No Micro (Auto)   Result Value Ref Range    Glucose, Ur Negative NEGATIVE mg/dl    Bilirubin, Urine Negative     Ketones, Urine Negative NEGATIVE    Specific Gravity, UA 1.020 1.002 - 1.030    Blood, UA POC Trace-lysed NEGATIVE    pH, Urine 6.00 5.0 - 9.0    Protein, Urine Trace (A) NEGATIVE mg/dl    Urobilinogen, Urine 0.20 0.0 - 1.0 eu/dl    Nitrite, Urine Negative NEGATIVE    Leukocyte Clumps, Urine Negative NEGATIVE    Color, UA Other [id number]    Character, Urine Slightly Cloudy [id number]       Orders:    POCT Urinalysis No Micro (Auto)      No follow-ups on file.       Subjective   Abdominal Pain  Associated symptoms include dysuria, a fever and frequency. Pertinent negatives include no constipation, diarrhea, headaches, hematuria, myalgias, nausea or vomiting.      Patient here today for a check up.  Reviewed BMI of 31.  Encouraged diet, exercise and weight loss. 4 to 5 days of abdominal pains, burning and frequency of urination.  Also fever and chills.  , current smoker, pmh reviewed.       Review of Systems   Constitutional:  Positive for chills and fever. Negative for fatigue and unexpected weight change.   HENT:  Negative for congestion, ear pain, rhinorrhea and sore throat.    Eyes:  Negative for pain and visual

## 2025-03-13 ENCOUNTER — TELEPHONE (OUTPATIENT)
Dept: FAMILY MEDICINE CLINIC | Age: 60
End: 2025-03-13

## 2025-03-13 DIAGNOSIS — N30.01 ACUTE CYSTITIS WITH HEMATURIA: Primary | ICD-10-CM

## 2025-03-13 RX ORDER — KETOROLAC TROMETHAMINE 10 MG/1
10 TABLET, FILM COATED ORAL EVERY 6 HOURS PRN
Qty: 20 TABLET | Refills: 0 | Status: SHIPPED | OUTPATIENT
Start: 2025-03-13

## 2025-03-13 NOTE — TELEPHONE ENCOUNTER
Pt called in asking for off work slip for Friday, Saturday and Sunday due to pain from uti. Pt also asking for something for pain

## 2025-03-18 ENCOUNTER — OFFICE VISIT (OUTPATIENT)
Dept: FAMILY MEDICINE CLINIC | Age: 60
End: 2025-03-18
Payer: COMMERCIAL

## 2025-03-18 VITALS
RESPIRATION RATE: 20 BRPM | HEART RATE: 130 BPM | SYSTOLIC BLOOD PRESSURE: 120 MMHG | DIASTOLIC BLOOD PRESSURE: 60 MMHG | BODY MASS INDEX: 30.59 KG/M2 | TEMPERATURE: 97.4 F | OXYGEN SATURATION: 94 % | WEIGHT: 183.8 LBS

## 2025-03-18 DIAGNOSIS — F32.A DEPRESSIVE DISORDER: ICD-10-CM

## 2025-03-18 DIAGNOSIS — R10.84 GENERALIZED ABDOMINAL PAIN: Primary | ICD-10-CM

## 2025-03-18 DIAGNOSIS — F41.9 ANXIETY: ICD-10-CM

## 2025-03-18 LAB
ALBUMIN SERPL BCG-MCNC: 3.9 G/DL (ref 3.4–4.9)
ALP SERPL-CCNC: 116 U/L (ref 35–104)
ALT SERPL W/O P-5'-P-CCNC: 13 U/L (ref 10–35)
ANION GAP SERPL CALC-SCNC: 17 MEQ/L (ref 8–16)
AST SERPL-CCNC: 13 U/L (ref 10–35)
BASOPHILS ABSOLUTE: 0.1 THOU/MM3 (ref 0–0.1)
BASOPHILS NFR BLD AUTO: 0.6 %
BILIRUB SERPL-MCNC: 0.4 MG/DL (ref 0.3–1.2)
BUN SERPL-MCNC: 15 MG/DL (ref 8–23)
CALCIUM SERPL-MCNC: 9.7 MG/DL (ref 8.8–10.2)
CHLORIDE SERPL-SCNC: 101 MEQ/L (ref 98–111)
CO2 SERPL-SCNC: 20 MEQ/L (ref 22–29)
CREAT SERPL-MCNC: 1.2 MG/DL (ref 0.5–0.9)
CRP SERPL-MCNC: 3.54 MG/DL (ref 0–0.5)
DEPRECATED RDW RBC AUTO: 39.1 FL (ref 35–45)
EOSINOPHIL NFR BLD AUTO: 11.5 %
EOSINOPHILS ABSOLUTE: 1.4 THOU/MM3 (ref 0–0.4)
ERYTHROCYTE [DISTWIDTH] IN BLOOD BY AUTOMATED COUNT: 12.6 % (ref 11.5–14.5)
ERYTHROCYTE [SEDIMENTATION RATE] IN BLOOD BY WESTERGREN METHOD: 17 MM/HR (ref 0–20)
GFR SERPL CREATININE-BSD FRML MDRD: 52 ML/MIN/1.73M2
GLUCOSE SERPL-MCNC: 95 MG/DL (ref 74–109)
HCT VFR BLD AUTO: 47.8 % (ref 37–47)
HGB BLD-MCNC: 15.7 GM/DL (ref 12–16)
IMM GRANULOCYTES # BLD AUTO: 0.05 THOU/MM3 (ref 0–0.07)
IMM GRANULOCYTES NFR BLD AUTO: 0.4 %
LIPASE SERPL-CCNC: 13 U/L (ref 13–60)
LYMPHOCYTES ABSOLUTE: 1.6 THOU/MM3 (ref 1–4.8)
LYMPHOCYTES NFR BLD AUTO: 13.3 %
MCH RBC QN AUTO: 27.8 PG (ref 26–33)
MCHC RBC AUTO-ENTMCNC: 32.8 GM/DL (ref 32.2–35.5)
MCV RBC AUTO: 84.8 FL (ref 81–99)
MONOCYTES ABSOLUTE: 0.7 THOU/MM3 (ref 0.4–1.3)
MONOCYTES NFR BLD AUTO: 6.1 %
NEUTROPHILS ABSOLUTE: 8.2 THOU/MM3 (ref 1.8–7.7)
NEUTROPHILS NFR BLD AUTO: 68.1 %
NRBC BLD AUTO-RTO: 0 /100 WBC
PLATELET # BLD AUTO: 223 THOU/MM3 (ref 130–400)
PMV BLD AUTO: 11.2 FL (ref 9.4–12.4)
POTASSIUM SERPL-SCNC: 3.7 MEQ/L (ref 3.5–5.2)
PROT SERPL-MCNC: 7.3 G/DL (ref 6.4–8.3)
RBC # BLD AUTO: 5.64 MILL/MM3 (ref 4.2–5.4)
SODIUM SERPL-SCNC: 138 MEQ/L (ref 135–145)
WBC # BLD AUTO: 12.1 THOU/MM3 (ref 4.8–10.8)

## 2025-03-18 PROCEDURE — 99214 OFFICE O/P EST MOD 30 MIN: CPT | Performed by: FAMILY MEDICINE

## 2025-03-18 RX ORDER — PANTOPRAZOLE SODIUM 40 MG/1
40 TABLET, DELAYED RELEASE ORAL
Qty: 30 TABLET | Refills: 0 | Status: SHIPPED | OUTPATIENT
Start: 2025-03-18

## 2025-03-18 RX ORDER — RISPERIDONE 1 MG/1
1 TABLET ORAL 2 TIMES DAILY
Qty: 60 TABLET | Refills: 10 | Status: SHIPPED | OUTPATIENT
Start: 2025-03-18

## 2025-03-18 RX ORDER — ONDANSETRON 4 MG/1
4 TABLET, FILM COATED ORAL EVERY 8 HOURS PRN
Qty: 30 TABLET | Refills: 0 | Status: SHIPPED | OUTPATIENT
Start: 2025-03-18

## 2025-03-18 ASSESSMENT — ENCOUNTER SYMPTOMS
WHEEZING: 0
NAUSEA: 1
CHEST TIGHTNESS: 0
BACK PAIN: 0
ABDOMINAL PAIN: 1
DIARRHEA: 1
RHINORRHEA: 0
CONSTIPATION: 0
VOMITING: 0
COUGH: 0
BLOOD IN STOOL: 0
EYE PAIN: 0
SORE THROAT: 0
SHORTNESS OF BREATH: 0

## 2025-03-18 NOTE — ASSESSMENT & PLAN NOTE
Chronic, not at goal (unstable), restart risperdol  -monitor sxs, call if not improving    Orders:    risperiDONE (RISPERDAL) 1 MG tablet; Take 1 tablet by mouth 2 times daily

## 2025-03-18 NOTE — PROGRESS NOTES
Tamra Sarabia (:  1965) is a 59 y.o. female,Established patient, here for evaluation of the following chief complaint(s):  Diarrhea (Nausea, abdominal pain, feels worse today than previously)         Assessment & Plan  Generalized abdominal pain   -new, unstable, add zofran for nausea and PPI, get blood work to further evaluate, work slip  -monitor sxs, call if not improving    Orders:    CBC with Auto Differential; Future    Comprehensive Metabolic Panel; Future    C-Reactive Protein; Future    Sedimentation Rate; Future    Lipase; Future    ondansetron (ZOFRAN) 4 MG tablet; Take 1 tablet by mouth every 8 hours as needed for Nausea    pantoprazole (PROTONIX) 40 MG tablet; Take 1 tablet by mouth every morning (before breakfast)    Anxiety   Chronic, not at goal (unstable), restart risperdol  -monitor sxs, call if not improving    Orders:    risperiDONE (RISPERDAL) 1 MG tablet; Take 1 tablet by mouth 2 times daily    Depressive disorder   Chronic, not at goal (unstable), restart risperdol  -monitor sxs, call if not improving    Orders:    risperiDONE (RISPERDAL) 1 MG tablet; Take 1 tablet by mouth 2 times daily      No follow-ups on file.       Subjective   Diarrhea   Associated symptoms include abdominal pain, chills and a fever. Pertinent negatives include no coughing, headaches, myalgias or vomiting.     Patient here today for a check up.  Reviewed BMI of 31.  Encouraged diet, exercise and weight loss.  Here for nausea, diarrhea, abdominal pains.  Is feeling worse.  Hot and cold.  , current smoker, pmh reviewed.      Review of Systems   Constitutional:  Positive for chills and fever. Negative for fatigue and unexpected weight change.   HENT:  Negative for congestion, ear pain, rhinorrhea and sore throat.    Eyes:  Negative for pain and visual disturbance.   Respiratory:  Negative for cough, chest tightness, shortness of breath and wheezing.    Cardiovascular:  Negative for chest pain and

## 2025-03-19 ENCOUNTER — RESULTS FOLLOW-UP (OUTPATIENT)
Dept: FAMILY MEDICINE CLINIC | Age: 60
End: 2025-03-19

## 2025-04-04 NOTE — PROGRESS NOTES
MG tablet Take 1 tablet by mouth every 6 hours as needed for Pain Yes Darin Mccallum MD   hydrOXYzine (ATARAX) 50 MG tablet Take 1 tablet by mouth 3 times daily as needed for Anxiety Yes Nicola Vicente APRN - CNP        No Known Allergies    Past Medical History:   Diagnosis Date    Cervical spinal stenosis     s/p surgery in march 2016    PVC (premature ventricular contraction)        Past Surgical History:   Procedure Laterality Date    CERVICAL SPINE SURGERY  03/2016       Social History     Socioeconomic History    Marital status:      Spouse name: Not on file    Number of children: Not on file    Years of education: Not on file    Highest education level: Not on file   Occupational History    Occupation: registered nurse   Tobacco Use    Smoking status: Every Day     Current packs/day: 1.00     Average packs/day: 1 pack/day for 39.6 years (39.6 ttl pk-yrs)     Types: Cigarettes     Start date: 6/26/1984    Smokeless tobacco: Never   Vaping Use    Vaping Use: Never used   Substance and Sexual Activity    Alcohol use: Yes     Comment: occasionally    Drug use: No    Sexual activity: Yes     Partners: Male   Other Topics Concern    Not on file   Social History Narrative    Not on file     Social Determinants of Health     Financial Resource Strain: Low Risk  (1/19/2024)    Overall Financial Resource Strain (CARDIA)     Difficulty of Paying Living Expenses: Not hard at all   Food Insecurity: No Food Insecurity (1/19/2024)    Hunger Vital Sign     Worried About Running Out of Food in the Last Year: Never true     Ran Out of Food in the Last Year: Never true   Transportation Needs: Unknown (1/19/2024)    PRAPARE - Transportation     Lack of Transportation (Medical): Not on file     Lack of Transportation (Non-Medical): No   Physical Activity: Not on file   Stress: Not on file   Social Connections: Not on file   Intimate Partner Violence: Not on file   Housing Stability: Unknown (1/19/2024)    Housing 
Oriented - self; Oriented - place; Oriented - time